# Patient Record
Sex: FEMALE | Race: WHITE | NOT HISPANIC OR LATINO
[De-identification: names, ages, dates, MRNs, and addresses within clinical notes are randomized per-mention and may not be internally consistent; named-entity substitution may affect disease eponyms.]

---

## 2018-02-22 PROBLEM — Z00.00 ENCOUNTER FOR PREVENTIVE HEALTH EXAMINATION: Status: ACTIVE | Noted: 2018-02-22

## 2018-03-01 ENCOUNTER — APPOINTMENT (OUTPATIENT)
Dept: ENDOCRINOLOGY | Facility: CLINIC | Age: 68
End: 2018-03-01
Payer: MEDICARE

## 2018-03-01 VITALS
HEART RATE: 64 BPM | WEIGHT: 95.56 LBS | DIASTOLIC BLOOD PRESSURE: 66 MMHG | HEIGHT: 62 IN | SYSTOLIC BLOOD PRESSURE: 130 MMHG | BODY MASS INDEX: 17.59 KG/M2

## 2018-03-01 DIAGNOSIS — M81.0 AGE-RELATED OSTEOPOROSIS W/OUT CURRENT PATHOLOGICAL FRACTURE: ICD-10-CM

## 2018-03-01 DIAGNOSIS — G45.9 TRANSIENT CEREBRAL ISCHEMIC ATTACK, UNSPECIFIED: ICD-10-CM

## 2018-03-01 DIAGNOSIS — Z82.62 FAMILY HISTORY OF OSTEOPOROSIS: ICD-10-CM

## 2018-03-01 DIAGNOSIS — G43.909 MIGRAINE, UNSPECIFIED, NOT INTRACTABLE, W/OUT STATUS MIGRAINOSUS: ICD-10-CM

## 2018-03-01 PROCEDURE — 99204 OFFICE O/P NEW MOD 45 MIN: CPT

## 2018-03-01 RX ORDER — SIMVASTATIN 80 MG/1
TABLET, FILM COATED ORAL
Refills: 0 | Status: ACTIVE | COMMUNITY

## 2018-03-01 RX ORDER — CALCIUM CITRATE/VITAMIN D3 315MG-6.25
TABLET ORAL
Refills: 0 | Status: ACTIVE | COMMUNITY

## 2018-03-01 RX ORDER — WARFARIN SODIUM 6 MG/1
TABLET ORAL
Refills: 0 | Status: ACTIVE | COMMUNITY

## 2018-03-01 RX ORDER — BUTALB/ACETAMINOPHEN/CAFFEINE 50-325-40
TABLET ORAL
Refills: 0 | Status: ACTIVE | COMMUNITY

## 2018-03-01 RX ORDER — MULTIVITAMIN
TABLET ORAL
Refills: 0 | Status: ACTIVE | COMMUNITY

## 2018-03-02 ENCOUNTER — TRANSCRIPTION ENCOUNTER (OUTPATIENT)
Age: 68
End: 2018-03-02

## 2018-04-19 ENCOUNTER — APPOINTMENT (OUTPATIENT)
Dept: INFUSION THERAPY | Facility: HOSPITAL | Age: 68
End: 2018-04-19

## 2018-05-03 ENCOUNTER — OUTPATIENT (OUTPATIENT)
Dept: OUTPATIENT SERVICES | Facility: HOSPITAL | Age: 68
LOS: 1 days | End: 2018-05-03
Payer: MEDICARE

## 2018-05-03 ENCOUNTER — APPOINTMENT (OUTPATIENT)
Dept: INFUSION THERAPY | Facility: HOSPITAL | Age: 68
End: 2018-05-03

## 2018-05-03 DIAGNOSIS — M81.0 AGE-RELATED OSTEOPOROSIS WITHOUT CURRENT PATHOLOGICAL FRACTURE: ICD-10-CM

## 2018-05-03 PROCEDURE — 96365 THER/PROPH/DIAG IV INF INIT: CPT

## 2018-05-03 RX ORDER — ZOLEDRONIC ACID 5 MG/100ML
5 INJECTION, SOLUTION INTRAVENOUS ONCE
Qty: 0 | Refills: 0 | Status: DISCONTINUED | OUTPATIENT
Start: 2018-05-03 | End: 2018-05-18

## 2018-08-20 PROBLEM — G45.9 TRANSIENT ISCHEMIC ATTACK: Status: ACTIVE | Noted: 2018-03-01

## 2019-08-06 ENCOUNTER — TRANSCRIPTION ENCOUNTER (OUTPATIENT)
Age: 69
End: 2019-08-06

## 2019-11-08 ENCOUNTER — APPOINTMENT (OUTPATIENT)
Dept: ORTHOPEDIC SURGERY | Facility: CLINIC | Age: 69
End: 2019-11-08
Payer: MEDICARE

## 2019-11-08 VITALS — HEART RATE: 57 BPM | SYSTOLIC BLOOD PRESSURE: 152 MMHG | DIASTOLIC BLOOD PRESSURE: 80 MMHG

## 2019-11-08 VITALS — BODY MASS INDEX: 17.11 KG/M2 | HEIGHT: 62 IN | WEIGHT: 93 LBS

## 2019-11-08 PROCEDURE — 99204 OFFICE O/P NEW MOD 45 MIN: CPT

## 2019-12-03 ENCOUNTER — APPOINTMENT (OUTPATIENT)
Dept: ORTHOPEDIC SURGERY | Facility: CLINIC | Age: 69
End: 2019-12-03
Payer: MEDICARE

## 2019-12-03 VITALS — BODY MASS INDEX: 17.11 KG/M2 | WEIGHT: 93 LBS | HEIGHT: 62 IN

## 2019-12-03 DIAGNOSIS — S92.009A UNSPECIFIED FRACTURE OF UNSPECIFIED CALCANEUS, INITIAL ENCOUNTER FOR CLOSED FRACTURE: ICD-10-CM

## 2019-12-03 PROCEDURE — 99214 OFFICE O/P EST MOD 30 MIN: CPT

## 2019-12-03 PROCEDURE — 73650 X-RAY EXAM OF HEEL: CPT | Mod: LT

## 2019-12-10 PROBLEM — S92.009A CALCANEAL FRACTURE: Status: ACTIVE | Noted: 2019-11-08

## 2019-12-10 NOTE — HISTORY OF PRESENT ILLNESS
[de-identified] : Josie is 69F who presents with left ankle and foot pain.   She has had pain since 10/26/19.  She denies injury or trauma to the ankle. She has had pain and swelling around the ankle and foot.   It is   6/10 in intensity, described as achy in nature.  Better with rest, worse with weightbearing.  She denies bruising of the area.  She had an MRI of the foot which showed a transverse nondisplaced calcaneal fracture along the anterior process with diffuse osteopenia\par

## 2019-12-10 NOTE — HISTORY OF PRESENT ILLNESS
[de-identified] : Josie presents for follow of left calcaneal stress fracture.  She has been wearing the boot.  Overall, pain is improved.  No new injury.  The swelilng has subsided.  Denies numbness, tingling.

## 2019-12-10 NOTE — DISCUSSION/SUMMARY
[de-identified] : Josie presents with calcaneal stress fracture.  She should continue non weightbearing in the boot for at least a total of 6 weeks.  Given her significant osteopenia, she may need up to 8 weeks in the boot.  Follow up in 3 weeks at which time we will re evaluate.  Eventually, we will refer her to PT to help wean from the boot.\par Liane Ricks MD, EdM\par Sports Medicine PM&R\par

## 2019-12-10 NOTE — PHYSICAL EXAM
[de-identified] : Constitutional: Well-nourished, well-developed, No acute distress\par Respiratory:  Good respiratory effort, no SOB\par Lymphatic: No regional lymphadenopathy, no lymphedema\par Psychiatric: Pleasant and normal affect, alert and oriented x3\par Skin: Clean dry and intact B/L UE/LE\par Musculoskeletal: normal except where as noted in regional exam\par left  Ankle:\par APPEARANCE: no marked deformities, no swelling or malalignment\par POSITIVE TENDERNESS: ATFL, subtalar joint, mild at mid anterior calcaneus\par NONTENDER: medial malleolus, lateral malleolus, tibialis posterior tendon, achilles tendon, no marked thickening of tendon, ATFL, CFL, PTFL, anterior tibiofibular ligament (high ankle), sinus tarsus, deltoid ligaments, 5th metatarsal. \par RANGE OF MOTION: full & painless. \par RESISTIVE TESTING: painless resisted dorsiflexion, plantar flexion, inversion & eversion. \par SPECIAL TESTS: neg calcaneal squeeze, calcaneal bump,  neg anterior drawer. neg talar tilt. neg Kleiger's\par NEURO: Normal sensation of LE, DTRs 2+/4 patella and achilles\par PULSES: 2+ DP/PT pulses\par B/L Hips: No asymmetry, malalignment, or swelling, Full ROM, 5/5 strength in flexion/ext, IR/ER, Abd/Add, Joints stable\par B/L Knees: No asymmetry, malalignment, or swelling, Full ROM, 5/5 strength in Flex/Ext, Joints stable\par  [de-identified] : \par The following radiographs were ordered and read by me during this patient's visit. I reviewed each radiograph in detail with the patient and discussed the findings as highlighted below. \par \par 2 views of the calcaneus were obtained today that show diffuse osteopenia.  There is no displaced fracture visualized\par

## 2019-12-10 NOTE — PHYSICAL EXAM
[de-identified] : Constitutional: Well-nourished, well-developed, No acute distress\par Respiratory:  Good respiratory effort, no SOB\par Lymphatic: No regional lymphadenopathy, no lymphedema\par Psychiatric: Pleasant and normal affect, alert and oriented x3\par Skin: Clean dry and intact B/L UE/LE\par Musculoskeletal: normal except where as noted in regional exam\par left  Ankle:\par APPEARANCE: no marked deformities, no swelling or malalignment\par POSITIVE TENDERNESS: ATFL, subtalar joint, mild at calcaneus\par NONTENDER: medial malleolus, lateral malleolus, tibialis posterior tendon, achilles tendon, no marked thickening of tendon, ATFL, CFL, PTFL, anterior tibiofibular ligament (high ankle), sinus tarsus, deltoid ligaments, 5th metatarsal. \par RANGE OF MOTION: full & painless. \par RESISTIVE TESTING: painless resisted dorsiflexion, plantar flexion, inversion & eversion. \par SPECIAL TESTS: neg calcaneal squeeze, calcaneal bump,  neg anterior drawer. neg talar tilt. neg Kleiger's\par NEURO: Normal sensation of LE, DTRs 2+/4 patella and achilles\par PULSES: 2+ DP/PT pulses\par B/L Hips: No asymmetry, malalignment, or swelling, Full ROM, 5/5 strength in flexion/ext, IR/ER, Abd/Add, Joints stable\par B/L Knees: No asymmetry, malalignment, or swelling, Full ROM, 5/5 strength in Flex/Ext, Joints stable\par  [de-identified] : Patient comes to today's visit with outside imaging already performed.  I reviewed the images in detail with the patient and discussed the findings as highlighted below. \par \par MRI left ankle, impression as follows:\par 1. diffuse marrow edema throughout all the osseous structures of the ankle.  This could related to osteopenia or diffuse stress reaction. \par 2. nondisplaced transverse fracture of the anterior process of the calcaneus\par 3 large ankle, small posterior subtalar and small talonavicular joint effusions.\par 4. degenerative changes of the navicular middle cuneiform joint\par 5. findings can be seen in the setting of sinus tarsi syndrome\par 6. severe subcutaneous edema surround the right ankle\par 7. no atfl tear or peroneal tendon tear

## 2020-01-07 ENCOUNTER — APPOINTMENT (OUTPATIENT)
Dept: ORTHOPEDIC SURGERY | Facility: CLINIC | Age: 70
End: 2020-01-07

## 2021-03-18 ENCOUNTER — APPOINTMENT (OUTPATIENT)
Dept: ENDOCRINOLOGY | Facility: CLINIC | Age: 71
End: 2021-03-18

## 2022-06-27 ENCOUNTER — EMERGENCY (EMERGENCY)
Facility: HOSPITAL | Age: 72
LOS: 1 days | Discharge: HOME | End: 2022-06-27
Attending: STUDENT IN AN ORGANIZED HEALTH CARE EDUCATION/TRAINING PROGRAM
Payer: MEDICARE

## 2022-06-27 VITALS
HEIGHT: 64 IN | DIASTOLIC BLOOD PRESSURE: 75 MMHG | RESPIRATION RATE: 16 BRPM | SYSTOLIC BLOOD PRESSURE: 150 MMHG | HEART RATE: 61 BPM | WEIGHT: 87.96 LBS | TEMPERATURE: 98 F | OXYGEN SATURATION: 98 %

## 2022-06-27 PROCEDURE — 70450 CT HEAD/BRAIN W/O DYE: CPT | Mod: 26,MA

## 2022-06-27 PROCEDURE — 99284 EMERGENCY DEPT VISIT MOD MDM: CPT

## 2022-06-27 PROCEDURE — 99284 EMERGENCY DEPT VISIT MOD MDM: CPT | Mod: 25

## 2022-06-27 PROCEDURE — 70450 CT HEAD/BRAIN W/O DYE: CPT | Mod: MA

## 2022-06-27 RX ORDER — TETANUS TOXOID, REDUCED DIPHTHERIA TOXOID AND ACELLULAR PERTUSSIS VACCINE, ADSORBED 5; 2.5; 8; 8; 2.5 [IU]/.5ML; [IU]/.5ML; UG/.5ML; UG/.5ML; UG/.5ML
0.5 SUSPENSION INTRAMUSCULAR ONCE
Refills: 0 | Status: DISCONTINUED | OUTPATIENT
Start: 2022-06-27 | End: 2022-06-27

## 2022-06-27 NOTE — ED ADULT NURSE NOTE - NS ED NURSE LEVEL OF CONSCIOUSNESS ORIENTATION
PT here today for enrollment in [facility]. PT was given informational packet and key points reviewed. Educated pt on reasoning from Coumadin therapy which includes pt's risk of other blood clots due to clotting disorder. Explained mechanism of action with Coumadin and Vitamin k. Risk and benefits of therapy were explained including rationale for INR range. Stressed compliance and consistency with diet, medications and appts. PT was educated on interactions of other medications and instructed to call [facility] with any med changes immediately including those that are short term. Instructed pt to take Coumadin between supper time and bed time. PT was educated on expectations of visits including intervals and frequencies. Educated pt on dietary restrictions and Coumadin friendly diet. PT was given informational sheets regarding acceptable green veggies.  Educated pt on reasoning behind not consuming those fruits and vegs. PT states she will avoid those high vitamin k foods. PT was instructed to contact [facility] for any planned medical procedures and to make sure all providers are aware of Coumadin therapy. PT was educated on s/s of bleeding and to report bleeding to ER immediately. PT was advised to wear medical alert bracelet. Emphasized safety while on blood thinner including power tool safety and to always obtain CT scan for head/blunt trauma. PT was instructed that [facility] would manage pt's INR and refill Coumadin.  Pt has been on Eliquis with last dose Saturday. Pt was instructed on dosing and to have a serving of green veggies today; pt verbalized. Will recheck in 2 days due to rapid rise in INR which could possibly be r/t false elevation from Eliquis. Approximately 30 minutes was spent with pt and she was receptive to learning. Findings reported by Hannah Gutierrez RN.    Today's INR is Lab Results - Last 18 Months   Lab Units 01/17/22  0000   INR  2.60*          
Oriented - self; Oriented - place; Oriented - time

## 2022-06-27 NOTE — ED PROVIDER NOTE - NS ED ROS FT
ROS: headache and abrasions   GENERAL: No fever, no chills  EYES: no change in vision  HEENT: no trouble swallowing, no trouble speaking  CARDIAC: no chest pain  PULMONARY: no cough, no shortness of breath  GI: no abdominal pain, no nausea, no vomiting, no diarrhea, no constipation  : No dysuria, no frequency, no change in appearance, or odor of urine  SKIN: no rashes  NEURO: no weakness  MSK: No joint pain    Osmel Gallegos PGY3

## 2022-06-27 NOTE — ED ADULT TRIAGE NOTE - CHIEF COMPLAINT QUOTE
Reports had headache after a heavy object fell on her while shopping yesterday ,denied falling/loc ,pain resolved now

## 2022-06-27 NOTE — ED PROVIDER NOTE - CLINICAL SUMMARY MEDICAL DECISION MAKING FREE TEXT BOX
72 year old female with prior medical history including CVA (no deficits, currently on Xarelto) presents to the ED with complaints of a headache after a  heavy pack of soda fell on top of her head. Patient is well appearing and neurologically intact. Low suspicion, but will screen for ICH with CT head. 72 year old female with prior medical history including CVA (no deficits, currently on Xarelto) presents to the ED with complaints of a headache after a  heavy pack of soda fell on top of her head. Patient is well appearing and neurologically intact. Low suspicion, but will screen for ICH with CT head. UTD on tetanus.

## 2022-06-27 NOTE — ED PROVIDER NOTE - OBJECTIVE STATEMENT
72 year old female with prior medical history including CVA (without deficits, currently on Xarelto) presents to the ED with complaints of a headache after a heavy pack of soda fell on top of her head yesterday. Patient denies loss of consciousness, falling at the time, and any other physical injuries and symptoms. Patient endorses that she sustained superficial abrasions to her scalp during the incident.  Allergy: ibuprofen (GI symptoms)

## 2022-06-27 NOTE — ED PROVIDER NOTE - PATIENT PORTAL LINK FT
You can access the FollowMyHealth Patient Portal offered by [facility] by registering at the following website: [email or web address]. By joining [employer]’s FollowMyHealth portal, you will also be able to view your health information using other applications (apps) compatible with our system.

## 2022-06-27 NOTE — ED PROVIDER NOTE - PHYSICAL EXAMINATION
Gen: AAOx3, non-toxic  Head: NCAT  HEENT: EOMI, oral mucosa moist, normal conjunctiva  Lung: CTAB, no respiratory distress, no wheezes/rhonchi/rales B/L, speaking in full sentences  CV: RRR, no murmurs, rubs or gallops  Abd: soft, NTND, no guarding, no CVA tenderness  MSK: no visible deformities, no midline spinal tenderness   Neuro: No focal sensory or motor deficits, normal CN exam   Skin: superficial abrasion to midline frontal scalp, no laceration or hematoma  Psych: normal affect.     Osmel Gallegos PGY3

## 2022-06-27 NOTE — ED ADULT NURSE NOTE - NSFALLRSKOUTCOME_ED_ALL_ED
PHYSICAL EXAM:    CONSTITUTIONAL: NAD, disheveled.  HEAD:  Atraumatic, normo-cephalic  EYES: EOMI, PERRLA, clear conjunctivae, anicteric sclerae, no nystagmus  NECK: Supple, no JVD, no carotid bruit   CHEST/LUNG: Clear to auscultation bilaterally, no wheezing or rhonchi  HEART: S1 S2 normal, regular rate and rhythm. No murmurs, rubs, or gallops  ABDOMEN: Bowel sounds present. Soft, non-tender, non-distended. No rebound or guarding. Left reducible inguinal hernia.   EXTREMITIES:  1+ peripheral pulses, no clubbing, cyanosis, or edema, left varicose veins.   PSYCH: Normal affect. Cooperative.  NEUROLOGY: AAOx2. No focal deficits. CN II-XII grossly intact  MSK: No joint swelling, redness   SKIN: Normal turgor, no rashes or lesions, onychomycosis of the bilateral toes Universal Safety Interventions

## 2023-12-09 ENCOUNTER — INPATIENT (INPATIENT)
Facility: HOSPITAL | Age: 73
LOS: 2 days | Discharge: HOME | DRG: 65 | End: 2023-12-12
Attending: INTERNAL MEDICINE | Admitting: INTERNAL MEDICINE
Payer: MEDICARE

## 2023-12-09 VITALS
DIASTOLIC BLOOD PRESSURE: 74 MMHG | SYSTOLIC BLOOD PRESSURE: 165 MMHG | TEMPERATURE: 98 F | RESPIRATION RATE: 18 BRPM | HEART RATE: 63 BPM | WEIGHT: 97 LBS | HEIGHT: 61 IN | OXYGEN SATURATION: 98 %

## 2023-12-09 DIAGNOSIS — G45.9 TRANSIENT CEREBRAL ISCHEMIC ATTACK, UNSPECIFIED: ICD-10-CM

## 2023-12-09 DIAGNOSIS — Z29.9 ENCOUNTER FOR PROPHYLACTIC MEASURES, UNSPECIFIED: ICD-10-CM

## 2023-12-09 DIAGNOSIS — G43.909 MIGRAINE, UNSPECIFIED, NOT INTRACTABLE, WITHOUT STATUS MIGRAINOSUS: ICD-10-CM

## 2023-12-09 DIAGNOSIS — E78.5 HYPERLIPIDEMIA, UNSPECIFIED: ICD-10-CM

## 2023-12-09 PROBLEM — I63.9 CEREBRAL INFARCTION, UNSPECIFIED: Chronic | Status: ACTIVE | Noted: 2022-06-27

## 2023-12-09 LAB
ALBUMIN SERPL ELPH-MCNC: 3.7 G/DL — SIGNIFICANT CHANGE UP (ref 3.5–5)
ALBUMIN SERPL ELPH-MCNC: 3.7 G/DL — SIGNIFICANT CHANGE UP (ref 3.5–5)
ALP SERPL-CCNC: 68 U/L — SIGNIFICANT CHANGE UP (ref 40–120)
ALP SERPL-CCNC: 68 U/L — SIGNIFICANT CHANGE UP (ref 40–120)
ALT FLD-CCNC: 44 U/L DA — SIGNIFICANT CHANGE UP (ref 10–60)
ALT FLD-CCNC: 44 U/L DA — SIGNIFICANT CHANGE UP (ref 10–60)
ANION GAP SERPL CALC-SCNC: 3 MMOL/L — LOW (ref 5–17)
ANION GAP SERPL CALC-SCNC: 3 MMOL/L — LOW (ref 5–17)
APTT BLD: 28.1 SEC — SIGNIFICANT CHANGE UP (ref 24.5–35.6)
APTT BLD: 28.1 SEC — SIGNIFICANT CHANGE UP (ref 24.5–35.6)
AST SERPL-CCNC: 31 U/L — SIGNIFICANT CHANGE UP (ref 10–40)
AST SERPL-CCNC: 31 U/L — SIGNIFICANT CHANGE UP (ref 10–40)
BASOPHILS # BLD AUTO: 0.04 K/UL — SIGNIFICANT CHANGE UP (ref 0–0.2)
BASOPHILS # BLD AUTO: 0.04 K/UL — SIGNIFICANT CHANGE UP (ref 0–0.2)
BASOPHILS NFR BLD AUTO: 1.2 % — SIGNIFICANT CHANGE UP (ref 0–2)
BASOPHILS NFR BLD AUTO: 1.2 % — SIGNIFICANT CHANGE UP (ref 0–2)
BILIRUB SERPL-MCNC: 0.3 MG/DL — SIGNIFICANT CHANGE UP (ref 0.2–1.2)
BILIRUB SERPL-MCNC: 0.3 MG/DL — SIGNIFICANT CHANGE UP (ref 0.2–1.2)
BUN SERPL-MCNC: 10 MG/DL — SIGNIFICANT CHANGE UP (ref 7–18)
BUN SERPL-MCNC: 10 MG/DL — SIGNIFICANT CHANGE UP (ref 7–18)
CALCIUM SERPL-MCNC: 8 MG/DL — LOW (ref 8.4–10.5)
CALCIUM SERPL-MCNC: 8 MG/DL — LOW (ref 8.4–10.5)
CHLORIDE SERPL-SCNC: 103 MMOL/L — SIGNIFICANT CHANGE UP (ref 96–108)
CHLORIDE SERPL-SCNC: 103 MMOL/L — SIGNIFICANT CHANGE UP (ref 96–108)
CO2 SERPL-SCNC: 29 MMOL/L — SIGNIFICANT CHANGE UP (ref 22–31)
CO2 SERPL-SCNC: 29 MMOL/L — SIGNIFICANT CHANGE UP (ref 22–31)
CREAT SERPL-MCNC: 0.55 MG/DL — SIGNIFICANT CHANGE UP (ref 0.5–1.3)
CREAT SERPL-MCNC: 0.55 MG/DL — SIGNIFICANT CHANGE UP (ref 0.5–1.3)
EGFR: 96 ML/MIN/1.73M2 — SIGNIFICANT CHANGE UP
EGFR: 96 ML/MIN/1.73M2 — SIGNIFICANT CHANGE UP
EOSINOPHIL # BLD AUTO: 0.06 K/UL — SIGNIFICANT CHANGE UP (ref 0–0.5)
EOSINOPHIL # BLD AUTO: 0.06 K/UL — SIGNIFICANT CHANGE UP (ref 0–0.5)
EOSINOPHIL NFR BLD AUTO: 1.8 % — SIGNIFICANT CHANGE UP (ref 0–6)
EOSINOPHIL NFR BLD AUTO: 1.8 % — SIGNIFICANT CHANGE UP (ref 0–6)
ETHANOL SERPL-MCNC: <3 MG/DL — SIGNIFICANT CHANGE UP (ref 0–10)
ETHANOL SERPL-MCNC: <3 MG/DL — SIGNIFICANT CHANGE UP (ref 0–10)
GLUCOSE SERPL-MCNC: 94 MG/DL — SIGNIFICANT CHANGE UP (ref 70–99)
GLUCOSE SERPL-MCNC: 94 MG/DL — SIGNIFICANT CHANGE UP (ref 70–99)
HCT VFR BLD CALC: 36 % — SIGNIFICANT CHANGE UP (ref 34.5–45)
HCT VFR BLD CALC: 36 % — SIGNIFICANT CHANGE UP (ref 34.5–45)
HGB BLD-MCNC: 11.2 G/DL — LOW (ref 11.5–15.5)
HGB BLD-MCNC: 11.2 G/DL — LOW (ref 11.5–15.5)
IMM GRANULOCYTES NFR BLD AUTO: 0 % — SIGNIFICANT CHANGE UP (ref 0–0.9)
IMM GRANULOCYTES NFR BLD AUTO: 0 % — SIGNIFICANT CHANGE UP (ref 0–0.9)
INR BLD: 0.9 RATIO — SIGNIFICANT CHANGE UP (ref 0.85–1.18)
INR BLD: 0.9 RATIO — SIGNIFICANT CHANGE UP (ref 0.85–1.18)
LYMPHOCYTES # BLD AUTO: 1.32 K/UL — SIGNIFICANT CHANGE UP (ref 1–3.3)
LYMPHOCYTES # BLD AUTO: 1.32 K/UL — SIGNIFICANT CHANGE UP (ref 1–3.3)
LYMPHOCYTES # BLD AUTO: 39.9 % — SIGNIFICANT CHANGE UP (ref 13–44)
LYMPHOCYTES # BLD AUTO: 39.9 % — SIGNIFICANT CHANGE UP (ref 13–44)
MCHC RBC-ENTMCNC: 30.3 PG — SIGNIFICANT CHANGE UP (ref 27–34)
MCHC RBC-ENTMCNC: 30.3 PG — SIGNIFICANT CHANGE UP (ref 27–34)
MCHC RBC-ENTMCNC: 31.1 GM/DL — LOW (ref 32–36)
MCHC RBC-ENTMCNC: 31.1 GM/DL — LOW (ref 32–36)
MCV RBC AUTO: 97.3 FL — SIGNIFICANT CHANGE UP (ref 80–100)
MCV RBC AUTO: 97.3 FL — SIGNIFICANT CHANGE UP (ref 80–100)
MONOCYTES # BLD AUTO: 0.3 K/UL — SIGNIFICANT CHANGE UP (ref 0–0.9)
MONOCYTES # BLD AUTO: 0.3 K/UL — SIGNIFICANT CHANGE UP (ref 0–0.9)
MONOCYTES NFR BLD AUTO: 9.1 % — SIGNIFICANT CHANGE UP (ref 2–14)
MONOCYTES NFR BLD AUTO: 9.1 % — SIGNIFICANT CHANGE UP (ref 2–14)
NEUTROPHILS # BLD AUTO: 1.59 K/UL — LOW (ref 1.8–7.4)
NEUTROPHILS # BLD AUTO: 1.59 K/UL — LOW (ref 1.8–7.4)
NEUTROPHILS NFR BLD AUTO: 48 % — SIGNIFICANT CHANGE UP (ref 43–77)
NEUTROPHILS NFR BLD AUTO: 48 % — SIGNIFICANT CHANGE UP (ref 43–77)
NRBC # BLD: 0 /100 WBCS — SIGNIFICANT CHANGE UP (ref 0–0)
NRBC # BLD: 0 /100 WBCS — SIGNIFICANT CHANGE UP (ref 0–0)
PLATELET # BLD AUTO: 277 K/UL — SIGNIFICANT CHANGE UP (ref 150–400)
PLATELET # BLD AUTO: 277 K/UL — SIGNIFICANT CHANGE UP (ref 150–400)
POTASSIUM SERPL-MCNC: 3.6 MMOL/L — SIGNIFICANT CHANGE UP (ref 3.5–5.3)
POTASSIUM SERPL-MCNC: 3.6 MMOL/L — SIGNIFICANT CHANGE UP (ref 3.5–5.3)
POTASSIUM SERPL-SCNC: 3.6 MMOL/L — SIGNIFICANT CHANGE UP (ref 3.5–5.3)
POTASSIUM SERPL-SCNC: 3.6 MMOL/L — SIGNIFICANT CHANGE UP (ref 3.5–5.3)
PROT SERPL-MCNC: 7 G/DL — SIGNIFICANT CHANGE UP (ref 6–8.3)
PROT SERPL-MCNC: 7 G/DL — SIGNIFICANT CHANGE UP (ref 6–8.3)
PROTHROM AB SERPL-ACNC: 10.3 SEC — SIGNIFICANT CHANGE UP (ref 9.5–13)
PROTHROM AB SERPL-ACNC: 10.3 SEC — SIGNIFICANT CHANGE UP (ref 9.5–13)
RBC # BLD: 3.7 M/UL — LOW (ref 3.8–5.2)
RBC # BLD: 3.7 M/UL — LOW (ref 3.8–5.2)
RBC # FLD: 13.6 % — SIGNIFICANT CHANGE UP (ref 10.3–14.5)
RBC # FLD: 13.6 % — SIGNIFICANT CHANGE UP (ref 10.3–14.5)
SODIUM SERPL-SCNC: 135 MMOL/L — SIGNIFICANT CHANGE UP (ref 135–145)
SODIUM SERPL-SCNC: 135 MMOL/L — SIGNIFICANT CHANGE UP (ref 135–145)
TROPONIN I, HIGH SENSITIVITY RESULT: 7.3 NG/L — SIGNIFICANT CHANGE UP
TROPONIN I, HIGH SENSITIVITY RESULT: 7.3 NG/L — SIGNIFICANT CHANGE UP
WBC # BLD: 3.31 K/UL — LOW (ref 3.8–10.5)
WBC # BLD: 3.31 K/UL — LOW (ref 3.8–10.5)
WBC # FLD AUTO: 3.31 K/UL — LOW (ref 3.8–10.5)
WBC # FLD AUTO: 3.31 K/UL — LOW (ref 3.8–10.5)

## 2023-12-09 PROCEDURE — 70450 CT HEAD/BRAIN W/O DYE: CPT | Mod: 26,MA,XU

## 2023-12-09 PROCEDURE — 70498 CT ANGIOGRAPHY NECK: CPT | Mod: 26,MA

## 2023-12-09 PROCEDURE — 0042T: CPT | Mod: MA

## 2023-12-09 PROCEDURE — 99222 1ST HOSP IP/OBS MODERATE 55: CPT

## 2023-12-09 PROCEDURE — 70496 CT ANGIOGRAPHY HEAD: CPT | Mod: 26,MA

## 2023-12-09 PROCEDURE — 99285 EMERGENCY DEPT VISIT HI MDM: CPT

## 2023-12-09 RX ORDER — ASPIRIN/CALCIUM CARB/MAGNESIUM 324 MG
81 TABLET ORAL DAILY
Refills: 0 | Status: DISCONTINUED | OUTPATIENT
Start: 2023-12-09 | End: 2023-12-12

## 2023-12-09 RX ORDER — ENOXAPARIN SODIUM 100 MG/ML
40 INJECTION SUBCUTANEOUS EVERY 24 HOURS
Refills: 0 | Status: DISCONTINUED | OUTPATIENT
Start: 2023-12-09 | End: 2023-12-11

## 2023-12-09 RX ORDER — CLOPIDOGREL BISULFATE 75 MG/1
75 TABLET, FILM COATED ORAL DAILY
Refills: 0 | Status: DISCONTINUED | OUTPATIENT
Start: 2023-12-09 | End: 2023-12-09

## 2023-12-09 RX ORDER — BUTALBITAL/ASPIRIN/CAFFEINE 50-325-40
2 TABLET ORAL
Refills: 0 | DISCHARGE

## 2023-12-09 RX ORDER — ASPIRIN/CALCIUM CARB/MAGNESIUM 324 MG
1 TABLET ORAL
Refills: 0 | DISCHARGE

## 2023-12-09 RX ORDER — ONDANSETRON 8 MG/1
1 TABLET, FILM COATED ORAL
Refills: 0 | DISCHARGE

## 2023-12-09 RX ORDER — ATORVASTATIN CALCIUM 80 MG/1
80 TABLET, FILM COATED ORAL AT BEDTIME
Refills: 0 | Status: DISCONTINUED | OUTPATIENT
Start: 2023-12-09 | End: 2023-12-11

## 2023-12-09 RX ORDER — ASPIRIN/CALCIUM CARB/MAGNESIUM 324 MG
324 TABLET ORAL ONCE
Refills: 0 | Status: COMPLETED | OUTPATIENT
Start: 2023-12-09 | End: 2023-12-09

## 2023-12-09 RX ORDER — RIVAROXABAN 15 MG-20MG
0 KIT ORAL
Qty: 0 | Refills: 0 | DISCHARGE

## 2023-12-09 RX ORDER — CLOPIDOGREL BISULFATE 75 MG/1
300 TABLET, FILM COATED ORAL ONCE
Refills: 0 | Status: COMPLETED | OUTPATIENT
Start: 2023-12-09 | End: 2023-12-09

## 2023-12-09 RX ORDER — SIMVASTATIN 20 MG/1
0.5 TABLET, FILM COATED ORAL
Refills: 0 | DISCHARGE

## 2023-12-09 RX ADMIN — ATORVASTATIN CALCIUM 80 MILLIGRAM(S): 80 TABLET, FILM COATED ORAL at 21:41

## 2023-12-09 RX ADMIN — CLOPIDOGREL BISULFATE 300 MILLIGRAM(S): 75 TABLET, FILM COATED ORAL at 12:38

## 2023-12-09 RX ADMIN — Medication 324 MILLIGRAM(S): at 12:38

## 2023-12-09 NOTE — ED PROVIDER NOTE - BIRTH SEX
[facility], [address] [place] [address] Urology Office Progress Note    Patient:  Zamzam Monet  YOB: 1957  Date: 9/7/2023    HISTORY OF PRESENT ILLNESS:   The patient is a 74 y.o. male  BPH symptoms are mild and not bothersome and thus medical therapy not indicated. No flank pain or gross hematuria to suggest recurrent kidney stones. Patient's 8/28/23 KUB shows stable non-obstructing bilateral kidney stones; no Rx required. Lower urinary tract symptoms: frequency, hesitancy, decreased urinary stream, nocturia, 2 times per night, and incomplete emptying.    Last AUA Symptom Score (QOL): 5 (2)  Today's AUA Symptom Score (QOL): 8 (2)    Summary of old records:   Kidney stones: h/o left 5 mm proximal ureteral calculus on 2/26/16 KUB, Left ESWL 3/17/16; 6 mm R mid pole KS on 10/31/16 KUB; 1/2/18, 8/27/20 KUB: Right 5 mm KS, Left 4 mm KS; 9/1/21, 8/30/22, 8/28/23 KUB (L=4, R=6 mm)  5/25/16 24 hour urine: vol=750 mL, normal Ca, Na, uric acid  Left renal cyst, 2.2 cm on 1/11/18 renal U/S, 1.3 cm on 10/10/18 U/S  Microhematuria with smoking hx: 2/6/20 CT-b/l KS (5mm L, 7 mm R), b/l renal cysts, BPH; 2/10/20 cysto: BPH  BPH    Additional History: none    Procedures Today: N/A    Urinalysis today:  Results for POC orders placed in visit on 09/07/23   POCT Urinalysis No Micro (Auto)   Result Value Ref Range    Color, UA yellow     Clarity, UA clear     Glucose, UA POC >=1000 mg/dl     Bilirubin, UA      Ketones, UA      Spec Grav, UA      Blood, UA POC neg     pH, UA      Protein, UA POC neg     Urobilinogen, UA      Leukocytes, UA neg     Nitrite, UA neg        Last several PSA's:  Lab Results   Component Value Date    PSA 2.62 08/28/2023    PSA 2.34 10/07/2022    PSA 2.93 09/01/2021       Last total testosterone:  No results found for: TESTOSTERONE    Last BUN and creatinine:  Lab Results   Component Value Date    BUN 21 12/01/2022     Lab Results   Component Value Date    CREATININE 1.37 Female

## 2023-12-09 NOTE — ED PROVIDER NOTE - CPE EDP GASTRO NORM
Arrives to room 315 S/P Bipolar Turp per Dr Mcgovern VSS afebrile denies pain no acute distress noted 20 [place] 3 WAy Rachel in place for continuous bladder irrigation 1100 ml clear pink urine emptied upon arrival to room Tele monitor in place box 8626 Lr infusing @100ml hour    normal...

## 2023-12-09 NOTE — H&P ADULT - NSHPPHYSICALEXAM_GEN_ALL_CORE
GENERAL: NAD, regular build  HEAD:  Atraumatic, Normocephalic  EYES: EOMI, PERRLA, conjunctiva and sclera clear  NECK: Supple  CHEST/LUNG: Clear to auscultation bilaterally, no RRW  HEART: Regular rate and rhythm; No murmurs, rubs, or gallops  ABDOMEN: Soft, Nontender, Nondistended; Bowel sounds present  EXTREMITIES:  2+ Peripheral Pulses, No edema  PSYCH: AAOx3  NEUROLOGY: non-focal. Negative F2F test and no pronator drift. 5/5 strength in all extremities, no slurring of speech.  SKIN: No rashes or lesions

## 2023-12-09 NOTE — ED ADULT NURSE NOTE - NSFALLUNIVINTERV_ED_ALL_ED
Bed/Stretcher in lowest position, wheels locked, appropriate side rails in place/Call bell, personal items and telephone in reach/Instruct patient to call for assistance before getting out of bed/chair/stretcher/Non-slip footwear applied when patient is off stretcher/[place] to call system/Physically safe environment - no spills, clutter or unnecessary equipment/Purposeful proactive rounding/Room/bathroom lighting operational, light cord in reach Bed/Stretcher in lowest position, wheels locked, appropriate side rails in place/Call bell, personal items and telephone in reach/Instruct patient to call for assistance before getting out of bed/chair/stretcher/Non-slip footwear applied when patient is off stretcher/[place] to call system/Physically safe environment - no spills, clutter or unnecessary equipment/Purposeful proactive rounding/Room/bathroom lighting operational, light cord in reach

## 2023-12-09 NOTE — ED PROVIDER NOTE - PROGRESS NOTE DETAILS
Radiologist called with CT perfusion showing ischemia in right pareital-occipital area that appears to be subacute.  He is reviewing the angio images now.  Discussed with Pt and her  again and she has not had any recent stroke symptoms that would go along with "subacute" time frame. I reassessed Pt and her symptoms have improved.  NIH Stroke Scale zero now. D/w Dr. Gilbert from telestroke and he advises  mg and Plavix 300 mg for now, but he agrees that TNK and thrombectomy are not indicated.

## 2023-12-09 NOTE — ED PROVIDER NOTE - NEUROLOGICAL, MLM
Alert and oriented, no focal deficits, no motor or sensory deficits.  No facial droop, no dysarthria.  Normal coordination.

## 2023-12-09 NOTE — ED ADULT NURSE NOTE - NS ED NURSE LEVEL OF CONSCIOUSNESS AFFECT
Appropriate Solaraze Counseling:  I discussed with the patient the risks of Solaraze including but not limited to erythema, scaling, itching, weeping, crusting, and pain.

## 2023-12-09 NOTE — ED PROVIDER NOTE - OBJECTIVE STATEMENT
73-year-old woman, history of prior TIA and stroke, hyperlipidemia, presents accompanied by her  for difficulty speaking, disorientation, and questionable right facial droop (as noted by her ).  He says that the facial droop seems to have resolved but she still seems disoriented in his opinion.  Last known normal was about 7 AM this morning when patient left the home to hang out with her friends, but then around 9 AM, the  received a phone call from the friends that the patient stopped talking.  There is no other report of any weakness or numbness.  She denies any other symptoms.  She is not on any anticoagulation.  No report of seizure.

## 2023-12-09 NOTE — H&P ADULT - PROBLEM SELECTOR PLAN 1
Pt presents with difficulty forming words, resolved upon arrival to ED  CT Head: Evidence of moderate chronic microvascular ischemic change with newly visualized focus of moderate hypoattenuation-cytotoxic edema right parieto-occipital region; a finding suspicious for acute-subacute ischemia in the appropriate clinical setting.  NIHSS 1 on admission (aphasia)  - C/w Neuro checks q4 hours  - C/w TELE monitoring  - C/w ASA, Statin, Plavix  - F/u Echo  - F/u MRI  - F/u A1c, Lipid Panel  Neuro  ___ Consulted Pt presents with difficulty forming words, resolved upon arrival to ED  CT Head: Evidence of moderate chronic microvascular ischemic change with newly visualized focus of moderate hypoattenuation-cytotoxic edema right parieto-occipital region; a finding suspicious for acute-subacute ischemia in the appropriate clinical setting.  NIHSS 1 on admission (aphasia)  - C/w Neuro checks q4 hours  - C/w TELE monitoring  - C/w ASA, Statin, Plavix  - F/u Echo  - F/u MRI  - F/u A1c, Lipid Panel  Neuro Dr. Jimenez Consulted

## 2023-12-09 NOTE — H&P ADULT - PROBLEM SELECTOR PLAN 2
Pt has a history of HLD, takes Simvastatin 10mg daily at home  - Increased to high intensity statin  - C/w Atorvastatin 80mg daily

## 2023-12-09 NOTE — ED PROVIDER NOTE - CLINICAL SUMMARY MEDICAL DECISION MAKING FREE TEXT BOX
73-year-old woman, history of prior TIA and stroke, hyperlipidemia, presents accompanied by her  for difficulty speaking, disorientation, and questionable right facial droop (as noted by her ).  He says that the facial droop seems to have resolved but she still seems disoriented in his opinion.  Last known normal was about 7 AM this morning when patient left the home to hang out with her friends, but then around 9 AM, the  received a phone call from the friends that the patient stopped talking--code stroke called--CT head, labs, EKG, FS BG, reassess.

## 2023-12-09 NOTE — H&P ADULT - HISTORY OF PRESENT ILLNESS
This is a 72 y/o F, from home with PMHx of three previous TIAs (no residual deficits), HLD, and migraines who presents with difficulty speaking. Pt states this morning she had difficulty saying any words which prompted her  to bring her to the hospital. Speech improved once she arrived to the hospital. Pt denies any recent illnesses, CP, fever, SOB, N/V/D, headaches, numbness or tingling.

## 2023-12-09 NOTE — CONSULT NOTE ADULT - ASSESSMENT
Acute left hemispheric infarct and older right parietooccipital infarcts; multiple microhemorrhages - ?consistent with cerebral amyloid angiopathy. continue aspirin; hold clopidogrel; continue atorvastatin; MR head w/o contrast

## 2023-12-09 NOTE — ED ADULT TRIAGE NOTE - CHIEF COMPLAINT QUOTE
reports pt " stopped talking mouth drooped " an hour ago pt was with friends and witnessed it  no facial droops noted at this time   gait imbalanced , not herself per

## 2023-12-09 NOTE — CONSULT NOTE ADULT - SUBJECTIVE AND OBJECTIVE BOX
Patient is a 73y old  Female who presents with a chief complaint of TIA (09 Dec 2023 14:40)      HPI:  Transient difficulty speaking and weakness right side now better; In the car in which she was brought tto the hospital, she was unable to find objects in her bag; here too she was unable to find valuables in her bag to give to her  for safekeeping.  She was able to access records from [facility]; I have reviewed the MR head that was done in May when she presented with transient visual difficulty in one eye - the MRI showed right parietal punctate DWI image signals but also showed bilateral punctate and larger SWI low intensity of old hemosiderin   PAST MEDICAL & SURGICAL HISTORY:  Cerebrovascular accident (CVA)      H/O hyperlipidemia      No significant past surgical history          FAMILY HISTORY:        Social Hx:  Nonsmoker, no drug or alcohol use    MEDICATIONS  (STANDING):  aspirin  chewable 81 milliGRAM(s) Oral daily  atorvastatin 80 milliGRAM(s) Oral at bedtime  enoxaparin Injectable 40 milliGRAM(s) SubCutaneous every 24 hours       Allergies    ibuprofen (Other)    Intolerances        ROS: Pertinent positives in HPI, all other ROS were reviewed and are negative.      Vital Signs Last 24 Hrs  T(C): 36.4 (09 Dec 2023 16:40), Max: 36.6 (09 Dec 2023 10:06)  T(F): 97.5 (09 Dec 2023 16:40), Max: 97.9 (09 Dec 2023 10:06)  HR: 62 (09 Dec 2023 16:40) (59 - 66)  BP: 136/72 (09 Dec 2023 16:40) (133/70 - 174/93)  BP(mean): --  RR: 18 (09 Dec 2023 16:40) (18 - 20)  SpO2: 99% (09 Dec 2023 16:40) (95% - 99%)    Parameters below as of 09 Dec 2023 16:40  Patient On (Oxygen Delivery Method): room air            Constitutional: awake and alert.  HEENT: PERRLA, EOMI,   Neck: Supple.  Respiratory: Breath sounds are clear bilaterally  Cardiovascular: S1 and S2, regular rhythm  Gastrointestinal: soft, nontender  Extremities:  no edema  Vascular: Caritid Bruit - no  Musculoskeletal: no joint swelling/tenderness, no abnormal movements  Skin: No rashes    Neurological exam:  HF: A x O x 3. Appropriately interactive, normal affect. Speech fluent, No Aphasia or paraphasic errors. Mild disorder Naming /repetition intact   CN: BO, EOMI, VFF, facial sensation normal, no NLFD, tongue midline, Palate moves equally, SCM equal bilaterally  Motor: No pronator drift, Strength 5/5 in all 4 ext, normal bulk and tone, no tremor, rigidity or bradykinesia.    Sens: Intact to light touch / PP/ VS/ JS    Reflexes: Symmetric and normal . BJ 2+, BR 2+, KJ 2+, AJ 2+, downgoing toes b/l  Coord:  No FNFA, dysmetria, VIRGILIO intact   Gait/Balance: Normal/    NIHSS: 2  MRS2    1A: Level of consciousness       0= Alert; keenly responsive    1B: Ask month and age       0= Both questions right    1C: "Blink eyes" and "Squeeze Hands"       0= Performs both      2: Horizontal EOMs       0= Normal      3: Visual fields       0= No visual loss      4: Facial palsy (use grimace if obtunded)       0= Normal symmetry     )    5A: Left arm motor drift (count out loud and use fingers to show count)       0= No drift x 10 seconds       5B: Right arm motor drift       0= No drift x 10 seconds         6A: Left leg motor drift       0= No drift x 10 seconds           6B: Right leg motor drift       0= No drift x 10 seconds    7: Limb ataxia (FNF/heel-shin)       +1= Ataxia in 1 limb       8: Sensation       0= Normal, no sensory loss     9: Language/aphasia- describe the scene (on joy); name the items; read the sentences (on joy)       +1= mild-moderate aphasia some obvious changes without significant limitation           10: Dysarthria- read the words       0= Normal    11: Extinction/inattention       0= No abnormality         Labs:                        11.2   3.31  )-----------( 277      ( 09 Dec 2023 10:45 )             36.0     12-09    135  |  103  |  10  ----------------------------<  94  3.6   |  29  |  0.55    Ca    8.0<L>      09 Dec 2023 10:45    TPro  7.0  /  Alb  3.7  /  TBili  0.3  /  DBili  x   /  AST  31  /  ALT  44  /  AlkPhos  68  12-09        PT/INR - ( 09 Dec 2023 10:45 )   PT: 10.3 sec;   INR: 0.90 ratio         PTT - ( 09 Dec 2023 10:45 )  PTT:28.1 sec    Radiology report:  - CT head:    < from: CT Brain Perfusion Maps Stroke (12.09.23 @ 10:48) >    ACC: [id number] EXAM:  CT ANGIO BRAIN STROKE PROTC IC   ORDERED BY: KENDAL JACKSON     ACC: [id number] EXAM:  CT ANGIO NECK STROKE PROTCL IC   ORDERED BY: KENDAL JACKSON     ACC: [id number] EXAM:  CT BRAIN STROKE PROTOCOL   ORDERED BY: KENDAL JACKSON     ACC: [id number] EXAM:  CT BRAIN PERFUSION MAPS STROKE   ORDERED BY: KENDAL JACKSON     PROCEDURE DATE:  12/09/2023          INTERPRETATION:  CLINICAL STATEMENT: Stroke code. Clinical concern for   CVA.    TECHNIQUE: Noncontrast CT brain axial plane. CTA brain and neck. CT   perfusion: After the administration of 50 cc of Omnipaque 300 serial thin   sections were obtained through the brain the purposes of evaluating CT   perfusion. Raw data was sent to the ischemia rapid view software for   postprocessing. 90cc Omnipaque 350 Intravenous contrast was administered   for the CTA compartment. 2-D MIP and 3-D volume rendering images. Degree   of extracranial carotid stenosis calculated utilizing NASCET criteria.   Artifact related to regions of dense venous contrast results in   suboptimal evaluation, obscuring the adjacent structures.  COMPARISON: CT brain 6/27/2022.    FINDINGS:    CT BRAIN    Mucosal thickening visualized paranasal sinuses. No air-fluid levels or   opacification. Incompletely pneumatized with partial opacification right   mastoid air cells. Left mastoid air cells and bilateral middle ear   regions well-aerated. No aggressive calvarial lesion.    Again seen is asymmetric size of the left greater than right lateral   ventricles, likely anatomic for the patient. Cavum septum pellucidum with   rightward deviation. Moderate patchy white matter hypodensities again   noted; a nonspecific finding which statistically reflects chronic   microvascular ischemic change. Newly visualized focus of moderately   hypoattenuation with cytotoxic edema right parieto-occipital region, with   sulcal effacement-mass effect.    No evidence of extra-axial collection or acute hemorrhage.    CTA NECK    Aortic arch and great vessel origins grossly unremarkable, however the   left subclavian origin is partially obscured by artifact related to   adjacent dense venous contrast.    Left proximal-mid vertebral artery obscured by adjacent dense venous   contrast, precluding evaluation. Otherwise, the vertebral arteries appear   patent and codominant. Segmental luminal irregularity-nodular artery   right proximal V3 segment.    Left extracranial carotid arteries largely obscured by artifact related   to adjacent dense venous contrast. Otherwise as visualized, there is no   definitive evidence of significant stenosis, occlusion or dissection   bilateral extracranial carotid arteries.    CTA BRAIN    Evidence of severe segmental stenosis P3 segment RIGHT posterior cerebral   artery (series 7 image [id number]). Moderate segmental stenosis P2 segment   LEFT posterior cerebral artery. No evidence of significant stenosis   distal vertebral and basilar arteries. Prepped    Abrupt truncation of a probable M4 cortical branch LEFT middle cerebral   artery(series 7 image 107-110) ipsilateral posterior frontal region.   Mild calcified plaque bilateral carotid siphons, without significant   stenosis.    No evidence of large vessel occlusion, definitive aneurysm or vascular   malformation.    CT PERFUSION    No core infarct.    CBF<30% volume: 0 ml  Tmax>6.0 s volume: 4 ml  Mismatch volume: 4 ml  Mismatch ratio: Infinite.    OTHER    No evidence of dural venous or jugular vein thrombosis. High riding right   jugular bulb with evidence of dehiscence of the ipsilateral sigmoid   plate. Bilateral lens replacement surgery. No suspicious upper lung mass.   Multifocal degenerative change. No aggressive osseous lesion.    IMPRESSION:    CT BRAIN  1. No evidence of acute hemorrhage.  2. Evidence of moderate chronic microvascular ischemic change with newly   visualized focus of moderate hypoattenuation-cytotoxic edema right   parieto-occipital region; a finding suspicious for acute-subacute   ischemia in the appropriate clinical setting.  3. Additional findings described in detail above.    CT PERFUSION  1. No predicted core infarct.  2. Evidence of active ischemia-tissue at risk right parieto-occipital   region (4 mL).    CTA  1. Partial obscuration of the left-sided neck vasculature secondary to  artifact related to adjacent dense venous contrast, precluding accurate   evaluation. Otherwise, the vertebral arteries appear patent and   codominant. Segmental luminal nodularity-irregularity RIGHT proximal V3   segment may reflect atypical atherosclerosis versus vasculitis.  2. As visualized, no definitive evidence of significant stenosis,   occlusion or dissection bilateral extracranial carotid arteries.  3. No evidence of large vessel occlusion, definitive aneurysm or vascular   malformation intracranial circulation. Severe segmental stenosis P3   segment RIGHT posterior cerebral artery as well as abrupt truncation of a   probable M4 cortical branch LEFT middle cerebral artery, as above.  4. Additional findings described in detail above, including evidence of a   dehiscent right jugular bulb; the significance of which should be   determined on a clinical basis.    In light of the above findings, recommend noncontrast MRI of the brain   for further evaluation, provided there are no medical contraindications.   Results of CT brain and recommendations discussed with Dr. Jackson at   approximately 11:00 AM the day of this exam.    --- End of Report ---            ELENA STEELE M.D., ATTENDING RADIOLOGIST  This document has been electronically signed. Dec  9 2023 11:38AM    < end of copied text >   Patient is a 73y old  Female who presents with a chief complaint of TIA (09 Dec 2023 14:40)      HPI:  Transient difficulty speaking and weakness right side now better; In the car in which she was brought tto the hospital, she was unable to find objects in her bag; here too she was unable to find valuables in her bag to give to her  for safekeeping.  She was able to access records from [facility]; I have reviewed the MR head that was done in May when she presented with transient visual difficulty in one eye - the MRI showed right parietal punctate DWI image signals but also showed bilateral punctate and larger SWI low intensity of old hemosiderin   PAST MEDICAL & SURGICAL HISTORY:  Cerebrovascular accident (CVA)      H/O hyperlipidemia      No significant past surgical history          FAMILY HISTORY:        Social Hx:  Nonsmoker, no drug or alcohol use    MEDICATIONS  (STANDING):  aspirin  chewable 81 milliGRAM(s) Oral daily  atorvastatin 80 milliGRAM(s) Oral at bedtime  enoxaparin Injectable 40 milliGRAM(s) SubCutaneous every 24 hours       Allergies    ibuprofen (Other)    Intolerances        ROS: Pertinent positives in HPI, all other ROS were reviewed and are negative.      Vital Signs Last 24 Hrs  T(C): 36.4 (09 Dec 2023 16:40), Max: 36.6 (09 Dec 2023 10:06)  T(F): 97.5 (09 Dec 2023 16:40), Max: 97.9 (09 Dec 2023 10:06)  HR: 62 (09 Dec 2023 16:40) (59 - 66)  BP: 136/72 (09 Dec 2023 16:40) (133/70 - 174/93)  BP(mean): --  RR: 18 (09 Dec 2023 16:40) (18 - 20)  SpO2: 99% (09 Dec 2023 16:40) (95% - 99%)    Parameters below as of 09 Dec 2023 16:40  Patient On (Oxygen Delivery Method): room air            Constitutional: awake and alert.  HEENT: PERRLA, EOMI,   Neck: Supple.  Respiratory: Breath sounds are clear bilaterally  Cardiovascular: S1 and S2, regular rhythm  Gastrointestinal: soft, nontender  Extremities:  no edema  Vascular: Caritid Bruit - no  Musculoskeletal: no joint swelling/tenderness, no abnormal movements  Skin: No rashes    Neurological exam:  HF: A x O x 3. Appropriately interactive, normal affect. Speech fluent, No Aphasia or paraphasic errors. Mild disorder Naming /repetition intact   CN: BO, EOMI, VFF, facial sensation normal, no NLFD, tongue midline, Palate moves equally, SCM equal bilaterally  Motor: No pronator drift, Strength 5/5 in all 4 ext, normal bulk and tone, no tremor, rigidity or bradykinesia.    Sens: Intact to light touch / PP/ VS/ JS    Reflexes: Symmetric and normal . BJ 2+, BR 2+, KJ 2+, AJ 2+, downgoing toes b/l  Coord:  No FNFA, dysmetria, VIRGILIO intact   Gait/Balance: Normal/    NIHSS: 2  MRS2    1A: Level of consciousness       0= Alert; keenly responsive    1B: Ask month and age       0= Both questions right    1C: "Blink eyes" and "Squeeze Hands"       0= Performs both      2: Horizontal EOMs       0= Normal      3: Visual fields       0= No visual loss      4: Facial palsy (use grimace if obtunded)       0= Normal symmetry     )    5A: Left arm motor drift (count out loud and use fingers to show count)       0= No drift x 10 seconds       5B: Right arm motor drift       0= No drift x 10 seconds         6A: Left leg motor drift       0= No drift x 10 seconds           6B: Right leg motor drift       0= No drift x 10 seconds    7: Limb ataxia (FNF/heel-shin)       +1= Ataxia in 1 limb       8: Sensation       0= Normal, no sensory loss     9: Language/aphasia- describe the scene (on joy); name the items; read the sentences (on joy)       +1= mild-moderate aphasia some obvious changes without significant limitation           10: Dysarthria- read the words       0= Normal    11: Extinction/inattention       0= No abnormality         Labs:                        11.2   3.31  )-----------( 277      ( 09 Dec 2023 10:45 )             36.0     12-09    135  |  103  |  10  ----------------------------<  94  3.6   |  29  |  0.55    Ca    8.0<L>      09 Dec 2023 10:45    TPro  7.0  /  Alb  3.7  /  TBili  0.3  /  DBili  x   /  AST  31  /  ALT  44  /  AlkPhos  68  12-09        PT/INR - ( 09 Dec 2023 10:45 )   PT: 10.3 sec;   INR: 0.90 ratio         PTT - ( 09 Dec 2023 10:45 )  PTT:28.1 sec    Radiology report:  - CT head:    < from: CT Brain Perfusion Maps Stroke (12.09.23 @ 10:48) >    ACC: [id number] EXAM:  CT ANGIO BRAIN STROKE PROTC IC   ORDERED BY: KENDAL JACKSON     ACC: [id number] EXAM:  CT ANGIO NECK STROKE PROTCL IC   ORDERED BY: KENDAL JACKSON     ACC: [id number] EXAM:  CT BRAIN STROKE PROTOCOL   ORDERED BY: KENDAL JACKSON     ACC: [id number] EXAM:  CT BRAIN PERFUSION MAPS STROKE   ORDERED BY: KENDAL JACKSON     PROCEDURE DATE:  12/09/2023          INTERPRETATION:  CLINICAL STATEMENT: Stroke code. Clinical concern for   CVA.    TECHNIQUE: Noncontrast CT brain axial plane. CTA brain and neck. CT   perfusion: After the administration of 50 cc of Omnipaque 300 serial thin   sections were obtained through the brain the purposes of evaluating CT   perfusion. Raw data was sent to the ischemia rapid view software for   postprocessing. 90cc Omnipaque 350 Intravenous contrast was administered   for the CTA compartment. 2-D MIP and 3-D volume rendering images. Degree   of extracranial carotid stenosis calculated utilizing NASCET criteria.   Artifact related to regions of dense venous contrast results in   suboptimal evaluation, obscuring the adjacent structures.  COMPARISON: CT brain 6/27/2022.    FINDINGS:    CT BRAIN    Mucosal thickening visualized paranasal sinuses. No air-fluid levels or   opacification. Incompletely pneumatized with partial opacification right   mastoid air cells. Left mastoid air cells and bilateral middle ear   regions well-aerated. No aggressive calvarial lesion.    Again seen is asymmetric size of the left greater than right lateral   ventricles, likely anatomic for the patient. Cavum septum pellucidum with   rightward deviation. Moderate patchy white matter hypodensities again   noted; a nonspecific finding which statistically reflects chronic   microvascular ischemic change. Newly visualized focus of moderately   hypoattenuation with cytotoxic edema right parieto-occipital region, with   sulcal effacement-mass effect.    No evidence of extra-axial collection or acute hemorrhage.    CTA NECK    Aortic arch and great vessel origins grossly unremarkable, however the   left subclavian origin is partially obscured by artifact related to   adjacent dense venous contrast.    Left proximal-mid vertebral artery obscured by adjacent dense venous   contrast, precluding evaluation. Otherwise, the vertebral arteries appear   patent and codominant. Segmental luminal irregularity-nodular artery   right proximal V3 segment.    Left extracranial carotid arteries largely obscured by artifact related   to adjacent dense venous contrast. Otherwise as visualized, there is no   definitive evidence of significant stenosis, occlusion or dissection   bilateral extracranial carotid arteries.    CTA BRAIN    Evidence of severe segmental stenosis P3 segment RIGHT posterior cerebral   artery (series 7 image 158-163). Moderate segmental stenosis P2 segment   LEFT posterior cerebral artery. No evidence of significant stenosis   distal vertebral and basilar arteries. Prepped    Abrupt truncation of a probable M4 cortical branch LEFT middle cerebral   artery(series 7 image 107-110) ipsilateral posterior frontal region.   Mild calcified plaque bilateral carotid siphons, without significant   stenosis.    No evidence of large vessel occlusion, definitive aneurysm or vascular   malformation.    CT PERFUSION    No core infarct.    CBF<30% volume: 0 ml  Tmax>6.0 s volume: 4 ml  Mismatch volume: 4 ml  Mismatch ratio: Infinite.    OTHER    No evidence of dural venous or jugular vein thrombosis. High riding right   jugular bulb with evidence of dehiscence of the ipsilateral sigmoid   plate. Bilateral lens replacement surgery. No suspicious upper lung mass.   Multifocal degenerative change. No aggressive osseous lesion.    IMPRESSION:    CT BRAIN  1. No evidence of acute hemorrhage.  2. Evidence of moderate chronic microvascular ischemic change with newly   visualized focus of moderate hypoattenuation-cytotoxic edema right   parieto-occipital region; a finding suspicious for acute-subacute   ischemia in the appropriate clinical setting.  3. Additional findings described in detail above.    CT PERFUSION  1. No predicted core infarct.  2. Evidence of active ischemia-tissue at risk right parieto-occipital   region (4 mL).    CTA  1. Partial obscuration of the left-sided neck vasculature secondary to  artifact related to adjacent dense venous contrast, precluding accurate   evaluation. Otherwise, the vertebral arteries appear patent and   codominant. Segmental luminal nodularity-irregularity RIGHT proximal V3   segment may reflect atypical atherosclerosis versus vasculitis.  2. As visualized, no definitive evidence of significant stenosis,   occlusion or dissection bilateral extracranial carotid arteries.  3. No evidence of large vessel occlusion, definitive aneurysm or vascular   malformation intracranial circulation. Severe segmental stenosis P3   segment RIGHT posterior cerebral artery as well as abrupt truncation of a   probable M4 cortical branch LEFT middle cerebral artery, as above.  4. Additional findings described in detail above, including evidence of a   dehiscent right jugular bulb; the significance of which should be   determined on a clinical basis.    In light of the above findings, recommend noncontrast MRI of the brain   for further evaluation, provided there are no medical contraindications.   Results of CT brain and recommendations discussed with Dr. Jackson at   approximately 11:00 AM the day of this exam.    --- End of Report ---            ELENA STEELE M.D., ATTENDING RADIOLOGIST  This document has been electronically signed. Dec  9 2023 11:38AM    < end of copied text >

## 2023-12-09 NOTE — PATIENT PROFILE ADULT - FALL HARM RISK - HARM RISK INTERVENTIONS
Assistance with ambulation/Assistance OOB with selected safe patient handling equipment/Communicate Risk of Fall with Harm to all staff/Discuss with provider need for PT consult/Monitor gait and stability/Provide patient with walking aids - walker, cane, crutches/Reinforce activity limits and safety measures with patient and family/Tailored Fall Risk Interventions/Visual Cue: Yellow wristband and red socks/Bed in lowest position, wheels locked, appropriate side rails in place/Call bell, personal items and telephone in reach/Instruct patient to call for assistance before getting out of bed or chair/Non-slip footwear when patient is out of bed/[place] to call system/Physically safe environment - no spills, clutter or unnecessary equipment/Purposeful Proactive Rounding/Room/bathroom lighting operational, light cord in reach Assistance with ambulation/Assistance OOB with selected safe patient handling equipment/Communicate Risk of Fall with Harm to all staff/Discuss with provider need for PT consult/Monitor gait and stability/Provide patient with walking aids - walker, cane, crutches/Reinforce activity limits and safety measures with patient and family/Tailored Fall Risk Interventions/Visual Cue: Yellow wristband and red socks/Bed in lowest position, wheels locked, appropriate side rails in place/Call bell, personal items and telephone in reach/Instruct patient to call for assistance before getting out of bed or chair/Non-slip footwear when patient is out of bed/[place] to call system/Physically safe environment - no spills, clutter or unnecessary equipment/Purposeful Proactive Rounding/Room/bathroom lighting operational, light cord in reach

## 2023-12-09 NOTE — H&P ADULT - ASSESSMENT
72 y/o F, from home with PMHx of three previous TIAs (no residual deficits), HLD, and migraines who presents with difficulty speaking. Symptoms resolved in ED. Admitted for TIA workup.

## 2023-12-10 LAB
A1C WITH ESTIMATED AVERAGE GLUCOSE RESULT: 5.6 % — SIGNIFICANT CHANGE UP (ref 4–5.6)
A1C WITH ESTIMATED AVERAGE GLUCOSE RESULT: 5.6 % — SIGNIFICANT CHANGE UP (ref 4–5.6)
ALBUMIN SERPL ELPH-MCNC: 3.2 G/DL — LOW (ref 3.5–5)
ALBUMIN SERPL ELPH-MCNC: 3.2 G/DL — LOW (ref 3.5–5)
ALP SERPL-CCNC: 58 U/L — SIGNIFICANT CHANGE UP (ref 40–120)
ALP SERPL-CCNC: 58 U/L — SIGNIFICANT CHANGE UP (ref 40–120)
ALT FLD-CCNC: 36 U/L DA — SIGNIFICANT CHANGE UP (ref 10–60)
ALT FLD-CCNC: 36 U/L DA — SIGNIFICANT CHANGE UP (ref 10–60)
ANION GAP SERPL CALC-SCNC: 6 MMOL/L — SIGNIFICANT CHANGE UP (ref 5–17)
ANION GAP SERPL CALC-SCNC: 6 MMOL/L — SIGNIFICANT CHANGE UP (ref 5–17)
AST SERPL-CCNC: 25 U/L — SIGNIFICANT CHANGE UP (ref 10–40)
AST SERPL-CCNC: 25 U/L — SIGNIFICANT CHANGE UP (ref 10–40)
BASOPHILS # BLD AUTO: 0.04 K/UL — SIGNIFICANT CHANGE UP (ref 0–0.2)
BASOPHILS # BLD AUTO: 0.04 K/UL — SIGNIFICANT CHANGE UP (ref 0–0.2)
BASOPHILS NFR BLD AUTO: 1.1 % — SIGNIFICANT CHANGE UP (ref 0–2)
BASOPHILS NFR BLD AUTO: 1.1 % — SIGNIFICANT CHANGE UP (ref 0–2)
BILIRUB SERPL-MCNC: 0.4 MG/DL — SIGNIFICANT CHANGE UP (ref 0.2–1.2)
BILIRUB SERPL-MCNC: 0.4 MG/DL — SIGNIFICANT CHANGE UP (ref 0.2–1.2)
BUN SERPL-MCNC: 13 MG/DL — SIGNIFICANT CHANGE UP (ref 7–18)
BUN SERPL-MCNC: 13 MG/DL — SIGNIFICANT CHANGE UP (ref 7–18)
CALCIUM SERPL-MCNC: 8.1 MG/DL — LOW (ref 8.4–10.5)
CALCIUM SERPL-MCNC: 8.1 MG/DL — LOW (ref 8.4–10.5)
CHLORIDE SERPL-SCNC: 107 MMOL/L — SIGNIFICANT CHANGE UP (ref 96–108)
CHLORIDE SERPL-SCNC: 107 MMOL/L — SIGNIFICANT CHANGE UP (ref 96–108)
CHOLEST SERPL-MCNC: 184 MG/DL — SIGNIFICANT CHANGE UP
CHOLEST SERPL-MCNC: 184 MG/DL — SIGNIFICANT CHANGE UP
CO2 SERPL-SCNC: 23 MMOL/L — SIGNIFICANT CHANGE UP (ref 22–31)
CO2 SERPL-SCNC: 23 MMOL/L — SIGNIFICANT CHANGE UP (ref 22–31)
CREAT SERPL-MCNC: 0.58 MG/DL — SIGNIFICANT CHANGE UP (ref 0.5–1.3)
CREAT SERPL-MCNC: 0.58 MG/DL — SIGNIFICANT CHANGE UP (ref 0.5–1.3)
EGFR: 94 ML/MIN/1.73M2 — SIGNIFICANT CHANGE UP
EGFR: 94 ML/MIN/1.73M2 — SIGNIFICANT CHANGE UP
EOSINOPHIL # BLD AUTO: 0.16 K/UL — SIGNIFICANT CHANGE UP (ref 0–0.5)
EOSINOPHIL # BLD AUTO: 0.16 K/UL — SIGNIFICANT CHANGE UP (ref 0–0.5)
EOSINOPHIL NFR BLD AUTO: 4.4 % — SIGNIFICANT CHANGE UP (ref 0–6)
EOSINOPHIL NFR BLD AUTO: 4.4 % — SIGNIFICANT CHANGE UP (ref 0–6)
ESTIMATED AVERAGE GLUCOSE: 114 MG/DL — SIGNIFICANT CHANGE UP (ref 68–114)
ESTIMATED AVERAGE GLUCOSE: 114 MG/DL — SIGNIFICANT CHANGE UP (ref 68–114)
GLUCOSE SERPL-MCNC: 92 MG/DL — SIGNIFICANT CHANGE UP (ref 70–99)
GLUCOSE SERPL-MCNC: 92 MG/DL — SIGNIFICANT CHANGE UP (ref 70–99)
HCT VFR BLD CALC: 35.5 % — SIGNIFICANT CHANGE UP (ref 34.5–45)
HCT VFR BLD CALC: 35.5 % — SIGNIFICANT CHANGE UP (ref 34.5–45)
HCV AB S/CO SERPL IA: 0.05 S/CO — SIGNIFICANT CHANGE UP (ref 0–0.99)
HCV AB S/CO SERPL IA: 0.05 S/CO — SIGNIFICANT CHANGE UP (ref 0–0.99)
HCV AB SERPL-IMP: SIGNIFICANT CHANGE UP
HCV AB SERPL-IMP: SIGNIFICANT CHANGE UP
HDLC SERPL-MCNC: 80 MG/DL — SIGNIFICANT CHANGE UP
HDLC SERPL-MCNC: 80 MG/DL — SIGNIFICANT CHANGE UP
HGB BLD-MCNC: 11.3 G/DL — LOW (ref 11.5–15.5)
HGB BLD-MCNC: 11.3 G/DL — LOW (ref 11.5–15.5)
IMM GRANULOCYTES NFR BLD AUTO: 0 % — SIGNIFICANT CHANGE UP (ref 0–0.9)
IMM GRANULOCYTES NFR BLD AUTO: 0 % — SIGNIFICANT CHANGE UP (ref 0–0.9)
LIPID PNL WITH DIRECT LDL SERPL: 92 MG/DL — SIGNIFICANT CHANGE UP
LIPID PNL WITH DIRECT LDL SERPL: 92 MG/DL — SIGNIFICANT CHANGE UP
LYMPHOCYTES # BLD AUTO: 1.14 K/UL — SIGNIFICANT CHANGE UP (ref 1–3.3)
LYMPHOCYTES # BLD AUTO: 1.14 K/UL — SIGNIFICANT CHANGE UP (ref 1–3.3)
LYMPHOCYTES # BLD AUTO: 31.2 % — SIGNIFICANT CHANGE UP (ref 13–44)
LYMPHOCYTES # BLD AUTO: 31.2 % — SIGNIFICANT CHANGE UP (ref 13–44)
MAGNESIUM SERPL-MCNC: 2.3 MG/DL — SIGNIFICANT CHANGE UP (ref 1.6–2.6)
MAGNESIUM SERPL-MCNC: 2.3 MG/DL — SIGNIFICANT CHANGE UP (ref 1.6–2.6)
MCHC RBC-ENTMCNC: 30.5 PG — SIGNIFICANT CHANGE UP (ref 27–34)
MCHC RBC-ENTMCNC: 30.5 PG — SIGNIFICANT CHANGE UP (ref 27–34)
MCHC RBC-ENTMCNC: 31.8 GM/DL — LOW (ref 32–36)
MCHC RBC-ENTMCNC: 31.8 GM/DL — LOW (ref 32–36)
MCV RBC AUTO: 95.7 FL — SIGNIFICANT CHANGE UP (ref 80–100)
MCV RBC AUTO: 95.7 FL — SIGNIFICANT CHANGE UP (ref 80–100)
MONOCYTES # BLD AUTO: 0.35 K/UL — SIGNIFICANT CHANGE UP (ref 0–0.9)
MONOCYTES # BLD AUTO: 0.35 K/UL — SIGNIFICANT CHANGE UP (ref 0–0.9)
MONOCYTES NFR BLD AUTO: 9.6 % — SIGNIFICANT CHANGE UP (ref 2–14)
MONOCYTES NFR BLD AUTO: 9.6 % — SIGNIFICANT CHANGE UP (ref 2–14)
NEUTROPHILS # BLD AUTO: 1.96 K/UL — SIGNIFICANT CHANGE UP (ref 1.8–7.4)
NEUTROPHILS # BLD AUTO: 1.96 K/UL — SIGNIFICANT CHANGE UP (ref 1.8–7.4)
NEUTROPHILS NFR BLD AUTO: 53.7 % — SIGNIFICANT CHANGE UP (ref 43–77)
NEUTROPHILS NFR BLD AUTO: 53.7 % — SIGNIFICANT CHANGE UP (ref 43–77)
NON HDL CHOLESTEROL: 104 MG/DL — SIGNIFICANT CHANGE UP
NON HDL CHOLESTEROL: 104 MG/DL — SIGNIFICANT CHANGE UP
NRBC # BLD: 0 /100 WBCS — SIGNIFICANT CHANGE UP (ref 0–0)
NRBC # BLD: 0 /100 WBCS — SIGNIFICANT CHANGE UP (ref 0–0)
PHOSPHATE SERPL-MCNC: 2.7 MG/DL — SIGNIFICANT CHANGE UP (ref 2.5–4.5)
PHOSPHATE SERPL-MCNC: 2.7 MG/DL — SIGNIFICANT CHANGE UP (ref 2.5–4.5)
PLATELET # BLD AUTO: 268 K/UL — SIGNIFICANT CHANGE UP (ref 150–400)
PLATELET # BLD AUTO: 268 K/UL — SIGNIFICANT CHANGE UP (ref 150–400)
POTASSIUM SERPL-MCNC: 4.1 MMOL/L — SIGNIFICANT CHANGE UP (ref 3.5–5.3)
POTASSIUM SERPL-MCNC: 4.1 MMOL/L — SIGNIFICANT CHANGE UP (ref 3.5–5.3)
POTASSIUM SERPL-SCNC: 4.1 MMOL/L — SIGNIFICANT CHANGE UP (ref 3.5–5.3)
POTASSIUM SERPL-SCNC: 4.1 MMOL/L — SIGNIFICANT CHANGE UP (ref 3.5–5.3)
PROT SERPL-MCNC: 6.2 G/DL — SIGNIFICANT CHANGE UP (ref 6–8.3)
PROT SERPL-MCNC: 6.2 G/DL — SIGNIFICANT CHANGE UP (ref 6–8.3)
RBC # BLD: 3.71 M/UL — LOW (ref 3.8–5.2)
RBC # BLD: 3.71 M/UL — LOW (ref 3.8–5.2)
RBC # FLD: 13.7 % — SIGNIFICANT CHANGE UP (ref 10.3–14.5)
RBC # FLD: 13.7 % — SIGNIFICANT CHANGE UP (ref 10.3–14.5)
SODIUM SERPL-SCNC: 136 MMOL/L — SIGNIFICANT CHANGE UP (ref 135–145)
SODIUM SERPL-SCNC: 136 MMOL/L — SIGNIFICANT CHANGE UP (ref 135–145)
TRIGL SERPL-MCNC: 61 MG/DL — SIGNIFICANT CHANGE UP
TRIGL SERPL-MCNC: 61 MG/DL — SIGNIFICANT CHANGE UP
TSH SERPL-MCNC: 2.97 UU/ML — SIGNIFICANT CHANGE UP (ref 0.34–4.82)
TSH SERPL-MCNC: 2.97 UU/ML — SIGNIFICANT CHANGE UP (ref 0.34–4.82)
WBC # BLD: 3.65 K/UL — LOW (ref 3.8–10.5)
WBC # BLD: 3.65 K/UL — LOW (ref 3.8–10.5)
WBC # FLD AUTO: 3.65 K/UL — LOW (ref 3.8–10.5)
WBC # FLD AUTO: 3.65 K/UL — LOW (ref 3.8–10.5)

## 2023-12-10 RX ADMIN — ATORVASTATIN CALCIUM 80 MILLIGRAM(S): 80 TABLET, FILM COATED ORAL at 21:07

## 2023-12-10 RX ADMIN — Medication 30 MILLILITER(S): at 23:24

## 2023-12-10 RX ADMIN — Medication 81 MILLIGRAM(S): at 14:22

## 2023-12-10 NOTE — PHYSICAL THERAPY INITIAL EVALUATION ADULT - ADDITIONAL COMMENTS
Patient lives in private house with a pprox 4 steps outside and 7 steps inside the house.  Patient is independent with transfers, ADL and indoor/outdoor ambulation with straight cane.

## 2023-12-10 NOTE — CONSULT NOTE ADULT - SUBJECTIVE AND OBJECTIVE BOX
MR#[id number]  PATIENT NAME:SUDHA METZ    DATE OF SERVICE: 12-10-23   Patient was seen and examined by Karthikeyan Subramanian MD on    12-10-23   Interim events noted.Consultant notes ,Labs,Telemetry reviewed by me       HOSPITAL COURSE: HPI:  This is a 72 y/o F, from home with PMHx of three previous TIAs (no residual deficits), HLD, and migraines who presents with difficulty speaking. Pt states this morning she had difficulty saying any words which prompted her  to bring her to the hospital. Speech improved once she arrived to the hospital. Pt denies any recent illnesses, CP, fever, SOB, N/V/D, headaches, numbness or tingling.      (09 Dec 2023 14:40)      INTERIM EVENTS:Patient seen at bedside ,interim events noted.      PMH -reviewed admission note, no change since admission  HEART FAILURE: Acute[ ]Chronic[ ] Systolic[ ] Diastolic[ ] Combined Systolic and Diastolic[ ]  CAD[ ] CABG[ ] PCI[ ]  DEVICES[ ] PPM[ ] ICD[ ] ILR[ ]  ATRIAL FIBRILLATION[ ] Paroxysmal[ ] Permanent[ ] CHADS2-[  ]  TOMER[ ] CKD1[ ] CKD2[ ] CKD3[ ] CKD4[ ] ESRD[ ]  COPD[ ] HTN[ ]   DM[ ] Type1[ ] Type 2[ ]   CVA[ ] Paresis[ ]    AMBULATION: Assisted[ ] Cane/walker[ ] Independent[ ]    MEDICATIONS  (STANDING):  aspirin  chewable 81 milliGRAM(s) Oral daily  atorvastatin 80 milliGRAM(s) Oral at bedtime  enoxaparin Injectable 40 milliGRAM(s) SubCutaneous every 24 hours    MEDICATIONS  (PRN):            REVIEW OF SYSTEMS:  Constitutional: [ ] fever, [ ]weight loss,  [ ]fatigue [ ]weight gain  Eyes: [ ] visual changes  Respiratory: [ ]shortness of breath;  [ ] cough, [ ]wheezing, [ ]chills, [ ]hemoptysis  Cardiovascular: [ ] chest pain, [ ]palpitations, [ ]dizziness,  [ ]leg swelling[ ]orthopnea[ ]PND  Gastrointestinal: [ ] abdominal pain, [ ]nausea, [ ]vomiting,  [ ]diarrhea [ ]Constipation [ ]Melena  Genitourinary: [ ] dysuria, [ ] hematuria [ ]Fan  Neurologic: [ ] headaches [ ] tremors[ ]weakness [ ]Paralysis Right[ ] Left[ ]  Skin: [ ] itching, [ ]burning, [ ] rashes  Endocrine: [ ] heat or cold intolerance  Musculoskeletal: [ ] joint pain or swelling; [ ] muscle, back, or extremity pain  Psychiatric: [ ] depression, [ ]anxiety, [ ]mood swings, or [ ]difficulty sleeping  Hematologic: [ ] easy bruising, [ ] bleeding gums    [ ] All remaining systems negative except as per above.   [ ]Unable to obtain.  [x] No change in ROS since admission      Vital Signs Last 24 Hrs  T(C): 36.9 (10 Dec 2023 18:12), Max: 36.9 (10 Dec 2023 18:12)  T(F): 98.4 (10 Dec 2023 18:12), Max: 98.4 (10 Dec 2023 18:12)  HR: 66 (10 Dec 2023 18:12) (51 - 71)  BP: 155/99 (10 Dec 2023 18:12) (111/69 - 155/99)  BP(mean): --  RR: 16 (10 Dec 2023 18:12) (16 - 18)  SpO2: 100% (10 Dec 2023 18:12) (98% - 100%)    Parameters below as of 10 Dec 2023 18:12  Patient On (Oxygen Delivery Method): room air      I&O's Summary      PHYSICAL EXAM:  General: No acute distress BMI-  HEENT: EOMI, PERRL  Neck: Supple, [ ] JVD  Lungs: Equal air entry bilaterally; [ ] rales [ ] wheezing [ ] rhonchi  Heart: Regular rate and rhythm; [x ] murmur   2/6 [ x] systolic [ ] diastolic [ ] radiation[ ] rubs [ ]  gallops  Abdomen: Nontender, bowel sounds present  Extremities: No clubbing, cyanosis, [ ] edema [ ]Pulses  equal and intact  Nervous system:  Alert & Oriented X3, no focal deficits  Psychiatric: Normal affect  Skin: No rashes or lesions    LABS:  12-10    136  |  107  |  13  ----------------------------<  92  4.1   |  23  |  0.58    Ca    8.1<L>      10 Dec 2023 06:33  Phos  2.7     12-10  Mg     2.3     12-10    TPro  6.2  /  Alb  3.2<L>  /  TBili  0.4  /  DBili  x   /  AST  25  /  ALT  36  /  AlkPhos  58  12-10    Creatinine Trend: 0.58<--, 0.55<--                        11.3   3.65  )-----------( 268      ( 10 Dec 2023 06:33 )             35.5     PT/INR - ( 09 Dec 2023 10:45 )   PT: 10.3 sec;   INR: 0.90 ratio         PTT - ( 09 Dec 2023 10:45 )  PTT:28.1 sec           MR#[id number]  PATIENT NAME:SUDHA METZ    DATE OF SERVICE: 12-10-23   Patient was seen and examined by Karthikeyan Subramanian MD on    12-10-23   Interim events noted.Consultant notes ,Labs,Telemetry reviewed by me       HOSPITAL COURSE: HPI:  This is a 72 y/o F, from home with PMHx of three previous TIAs (no residual deficits), HLD, and migraines who presents with difficulty speaking. Pt states this morning she had difficulty saying any words which prompted her  to bring her to the hospital. Speech improved once she arrived to the hospital. Pt denies any recent illnesses, CP, fever, SOB, N/V/D, headaches, numbness or tingling.      (09 Dec 2023 14:40)      INTERIM EVENTS:Patient seen at bedside ,interim events noted.      PMH -reviewed admission note, no change since admission  HEART FAILURE: Acute[ ]Chronic[ ] Systolic[ ] Diastolic[ ] Combined Systolic and Diastolic[ ]  CAD[ ] CABG[ ] PCI[ ]  DEVICES[ ] PPM[ ] ICD[ ] ILR[ ]  ATRIAL FIBRILLATION[ ] Paroxysmal[ ] Permanent[ ] CHADS2-[  ]  TOMER[ ] CKD1[ ] CKD2[ ] CKD3[ ] CKD4[ ] ESRD[ ]  COPD[ ] HTN[ ]   DM[ ] Type1[ ] Type 2[ ]   CVA[ ] Paresis[ ]    AMBULATION: Assisted[ ] Cane/walker[ ] Independent[ ]    MEDICATIONS  (STANDING):  aspirin  chewable 81 milliGRAM(s) Oral daily  atorvastatin 80 milliGRAM(s) Oral at bedtime  enoxaparin Injectable 40 milliGRAM(s) SubCutaneous every 24 hours    MEDICATIONS  (PRN):            REVIEW OF SYSTEMS:  Constitutional: [ ] fever, [ ]weight loss,  [ ]fatigue [ ]weight gain  Eyes: [ ] visual changes  Respiratory: [ ]shortness of breath;  [ ] cough, [ ]wheezing, [ ]chills, [ ]hemoptysis  Cardiovascular: [ ] chest pain, [ ]palpitations, [ ]dizziness,  [ ]leg swelling[ ]orthopnea[ ]PND  Gastrointestinal: [ ] abdominal pain, [ ]nausea, [ ]vomiting,  [ ]diarrhea [ ]Constipation [ ]Melena  Genitourinary: [ ] dysuria, [ ] hematuria [ ]Fan  Neurologic: [ ] headaches [ ] tremors[ ]weakness [ ]Paralysis Right[ ] Left[ ]  Skin: [ ] itching, [ ]burning, [ ] rashes  Endocrine: [ ] heat or cold intolerance  Musculoskeletal: [ ] joint pain or swelling; [ ] muscle, back, or extremity pain  Psychiatric: [ ] depression, [ ]anxiety, [ ]mood swings, or [ ]difficulty sleeping  Hematologic: [ ] easy bruising, [ ] bleeding gums    [ ] All remaining systems negative except as per above.   [ ]Unable to obtain.  [x] No change in ROS since admission      Vital Signs Last 24 Hrs  T(C): 36.9 (10 Dec 2023 18:12), Max: 36.9 (10 Dec 2023 18:12)  T(F): 98.4 (10 Dec 2023 18:12), Max: 98.4 (10 Dec 2023 18:12)  HR: 66 (10 Dec 2023 18:12) (51 - 71)  BP: 155/99 (10 Dec 2023 18:12) (111/69 - 155/99)  BP(mean): --  RR: 16 (10 Dec 2023 18:12) (16 - 18)  SpO2: 100% (10 Dec 2023 18:12) (98% - 100%)    Parameters below as of 10 Dec 2023 18:12  Patient On (Oxygen Delivery Method): room air      I&O's Summary      PHYSICAL EXAM:  General: No acute distress BMI-  HEENT: EOMI, PERRL  Neck: Supple, [ ] JVD  Lungs: Equal air entry bilaterally; [ ] rales [ ] wheezing [ ] rhonchi  Heart: Regular rate and rhythm; [x ] murmur   2/6 [ x] systolic [ ] diastolic [ ] radiation[ ] rubs [ ]  gallops  Abdomen: Nontender, bowel sounds present  Extremities: No clubbing, cyanosis, [ ] edema [ ]Pulses  equal and intact  Nervous system:  Alert & Oriented X3, no focal deficits  Psychiatric: Normal affect  Skin: No rashes or lesions    LABS:  12-10    136  |  107  |  13  ----------------------------<  92  4.1   |  23  |  0.58    Ca    8.1<L>      10 Dec 2023 06:33  Phos  2.7     12-10  Mg     2.3     12-10    TPro  6.2  /  Alb  3.2<L>  /  TBili  0.4  /  DBili  x   /  AST  25  /  ALT  36  /  AlkPhos  58  12-10    Creatinine Trend: 0.58<--, 0.55<--                        11.3   3.65  )-----------( 268      ( 10 Dec 2023 06:33 )             35.5     PT/INR - ( 09 Dec 2023 10:45 )   PT: 10.3 sec;   INR: 0.90 ratio         PTT - ( 09 Dec 2023 10:45 )  PTT:28.1 sec

## 2023-12-10 NOTE — CONSULT NOTE ADULT - ASSESSMENT
72 y/o F, from home with PMHx of three previous TIAs (no residual deficits), HLD, and migraines who presents with difficulty speaking. Symptoms resolved in ED. Admitted for TIA workup.    #  TIA (transient ischemic attack).   ·  Plan: Pt presents with difficulty forming words, resolved upon arrival to ED  CT Head: Evidence of moderate chronic microvascular ischemic change with newly visualized focus of moderate hypoattenuation-cytotoxic edema right parieto-occipital region; a finding suspicious for acute-subacute ischemia in the appropriate clinical setting.  NIHSS 1 on admission (aphasia)  - C/w Neuro checks q4 hours  - C/w TELE monitoring  - C/w ASA, Statin, Plavix  - F/u Echo  - F/u MRI  - f/u neuro 74 y/o F, from home with PMHx of three previous TIAs (no residual deficits), HLD, and migraines who presents with difficulty speaking. Symptoms resolved in ED. Admitted for TIA workup.    #  TIA (transient ischemic attack).   ·  Plan: Pt presents with difficulty forming words, resolved upon arrival to ED  CT Head: Evidence of moderate chronic microvascular ischemic change with newly visualized focus of moderate hypoattenuation-cytotoxic edema right parieto-occipital region; a finding suspicious for acute-subacute ischemia in the appropriate clinical setting.  NIHSS 1 on admission (aphasia)  - C/w Neuro checks q4 hours  - C/w TELE monitoring  - C/w ASA, Statin, Plavix  - F/u Echo  - F/u MRI  - f/u neuro

## 2023-12-10 NOTE — PROGRESS NOTE ADULT - PROBLEM SELECTOR PLAN 1
Pt presents with difficulty forming words, resolved upon arrival to ED  CT Head: Evidence of moderate chronic microvascular ischemic change with newly visualized focus of moderate hypoattenuation-cytotoxic edema right parieto-occipital region; a finding suspicious for acute-subacute ischemia in the appropriate clinical setting.  NIHSS 1 on admission (aphasia)  - C/w Neuro checks q4 hours  - C/w TELE monitoring  - C/w ASA, Statin, Plavix  - F/u Echo  - F/u MRI  - F/u A1c, Lipid Panel  Neuro Dr. Jimenez Consulted

## 2023-12-10 NOTE — PROGRESS NOTE ADULT - SUBJECTIVE AND OBJECTIVE BOX
PGY-1 Progress Note discussed with attending    PAGER #: [[phone]] TILL 5:00 PM  PLEASE CONTACT ON CALL TEAM:  - On Call Team (Please refer to Dustin) FROM 5:00 PM - 8:30PM  - Nightfloat Team FROM 8:30 -7:30 AM    INTERVAL HPI/OVERNIGHT EVENTS:   No overnight events. Pt was examined in the morning at bedside with spouse and son present. Pt is AAOx3 and in NAD. Pt denies any symptoms     REVIEW OF SYSTEMS:  CONSTITUTIONAL: No fever, chills,  or fatigue  RESPIRATORY: No cough, wheezing, No shortness of breath  CARDIOVASCULAR: No chest pain, palpitations,  leg swelling  GASTROINTESTINAL: No abdominal pain. No nausea, vomiting, or hematemesis; No diarrhea or constipation. No bloody or black stool  GENITOURINARY: No dysuria or hematuria, urinary frequency  NEUROLOGICAL: No headaches, dizziness  SKIN: No itching, burning, rashes, or lesions     Vital Signs Last 24 Hrs  T(C): 36.4 (10 Dec 2023 06:07), Max: 36.5 (09 Dec 2023 18:52)  T(F): 97.5 (10 Dec 2023 06:07), Max: 97.7 (09 Dec 2023 18:52)  HR: 62 (10 Dec 2023 11:08) (51 - 65)  BP: 119/81 (10 Dec 2023 11:08) (119/81 - 145/75)  BP(mean): 91 (09 Dec 2023 18:52) (91 - 91)  RR: 18 (10 Dec 2023 06:07) (17 - 20)  SpO2: 98% (10 Dec 2023 11:08) (97% - 99%)    Parameters below as of 10 Dec 2023 06:07  Patient On (Oxygen Delivery Method): room air        PHYSICAL EXAMINATION:  GENERAL: NAD,   HEAD:  Atraumatic, Normocephalic  EYES:  PERRLA,  conjunctiva and sclera clear  NECK: Supple, No JVD,   CHEST/LUNG: Clear to auscultation. Clear to percussion bilaterally; No rales, rhonchi, wheezing, or rubs  HEART: Regular rate and rhythm; No murmurs, rubs, or gallops  ABDOMEN: Soft, Nontender, Nondistended; Bowel sounds present, No Rovsing's sign   NERVOUS SYSTEM:  Alert & Oriented X3,    EXTREMITIES:  2+ Peripheral Pulses, No clubbing, cyanosis, or edema  CALF: No Calf tenderness   SKIN: warm dry                          11.3   3.65  )-----------( 268      ( 10 Dec 2023 06:33 )             35.5     12-10    136  |  107  |  13  ----------------------------<  92  4.1   |  23  |  0.58    Ca    8.1<L>      10 Dec 2023 06:33  Phos  2.7     12-10  Mg     2.3     12-10    TPro  6.2  /  Alb  3.2<L>  /  TBili  0.4  /  DBili  x   /  AST  25  /  ALT  36  /  AlkPhos  58  12-10    LIVER FUNCTIONS - ( 10 Dec 2023 06:33 )  Alb: 3.2 g/dL / Pro: 6.2 g/dL / ALK PHOS: 58 U/L / ALT: 36 U/L DA / AST: 25 U/L / GGT: x               PT/INR - ( 09 Dec 2023 10:45 )   PT: 10.3 sec;   INR: 0.90 ratio         PTT - ( 09 Dec 2023 10:45 )  PTT:28.1 sec    CAPILLARY BLOOD GLUCOSE      RADIOLOGY & ADDITIONAL TESTS:                   PGY-1 Progress Note discussed with attending    PAGER #: [[phone]] TILL 5:00 PM  PLEASE CONTACT ON CALL TEAM:  - On Call Team (Please refer to Dustin) FROM 5:00 PM - 8:30PM  - Nightfloat Team FROM 8:30 -7:30 AM    INTERVAL HPI/OVERNIGHT EVENTS:   No overnight events. Pt was examined in the morning at bedside with spouse and son present. Pt is AAOx3 and in NAD. Pt denies any symptoms     REVIEW OF SYSTEMS:  CONSTITUTIONAL: No fever, chills,  or fatigue  RESPIRATORY: No cough, wheezing, No shortness of breath  CARDIOVASCULAR: No chest pain, palpitations,  leg swelling  GASTROINTESTINAL: No abdominal pain. No nausea, vomiting, or hematemesis; No diarrhea or constipation. No bloody or black stool  GENITOURINARY: No dysuria or hematuria, urinary frequency  NEUROLOGICAL: No headaches, dizziness  SKIN: No itching, burning, rashes, or lesions     Vital Signs Last 24 Hrs  T(C): 36.4 (10 Dec 2023 06:07), Max: 36.5 (09 Dec 2023 18:52)  T(F): 97.5 (10 Dec 2023 06:07), Max: 97.7 (09 Dec 2023 18:52)  HR: 62 (10 Dec 2023 11:08) (51 - 65)  BP: 119/81 (10 Dec 2023 11:08) (119/81 - 145/75)  BP(mean): 91 (09 Dec 2023 18:52) (91 - 91)  RR: 18 (10 Dec 2023 06:07) (17 - 20)  SpO2: 98% (10 Dec 2023 11:08) (97% - 99%)    Parameters below as of 10 Dec 2023 06:07  Patient On (Oxygen Delivery Method): room air        PHYSICAL EXAMINATION:  GENERAL: NAD,   HEAD:  Atraumatic, Normocephalic  EYES:  PERRLA,  conjunctiva and sclera clear  NECK: Supple, No JVD,   CHEST/LUNG: Clear to auscultation. Clear to percussion bilaterally; No rales, rhonchi, wheezing, or rubs  HEART: Regular rate and rhythm; No murmurs, rubs, or gallops  ABDOMEN: Soft, Nontender, Nondistended; Bowel sounds present, No Rovsing's sign   NERVOUS SYSTEM:  Alert & Oriented X3,    EXTREMITIES:  2+ Peripheral Pulses, No clubbing, cyanosis, or edema  CALF: No Calf tenderness   SKIN: warm dry                          11.3   3.65  )-----------( 268      ( 10 Dec 2023 06:33 )             35.5     12-10    136  |  107  |  13  ----------------------------<  92  4.1   |  23  |  0.58    Ca    8.1<L>      10 Dec 2023 06:33  Phos  2.7     12-10  Mg     2.3     12-10    TPro  6.2  /  Alb  3.2<L>  /  TBili  0.4  /  DBili  x   /  AST  25  /  ALT  36  /  AlkPhos  58  12-10    LIVER FUNCTIONS - ( 10 Dec 2023 06:33 )  Alb: 3.2 g/dL / Pro: 6.2 g/dL / ALK PHOS: 58 U/L / ALT: 36 U/L DA / AST: 25 U/L / GGT: x               PT/INR - ( 09 Dec 2023 10:45 )   PT: 10.3 sec;   INR: 0.90 ratio         PTT - ( 09 Dec 2023 10:45 )  PTT:28.1 sec    CAPILLARY BLOOD GLUCOSE      RADIOLOGY & ADDITIONAL TESTS:                   PGY-1 Progress Note discussed with attending    PAGER #: [[phone]] TILL 5:00 PM  PLEASE CONTACT ON CALL TEAM:  - On Call Team (Please refer to Dustin) FROM 5:00 PM - 8:30PM  - Nightfloat Team FROM 8:30 -7:30 AM    INTERVAL HPI/OVERNIGHT EVENTS:   No overnight events. Pt was examined in the morning at bedside with spouse and son present. Pt is AAOx3 and in NAD. Pt denies any symptoms     REVIEW OF SYSTEMS:  CONSTITUTIONAL: No fever, chills,  or fatigue  RESPIRATORY: No cough, wheezing, No shortness of breath  CARDIOVASCULAR: No chest pain, palpitations,  leg swelling  GASTROINTESTINAL: No abdominal pain. No nausea, vomiting, or hematemesis; No diarrhea or constipation. No bloody or black stool  GENITOURINARY: No dysuria or hematuria, urinary frequency  NEUROLOGICAL: No headaches, dizziness  SKIN: No itching, burning, rashes, or lesions     Vital Signs Last 24 Hrs  T(C): 36.4 (10 Dec 2023 06:07), Max: 36.5 (09 Dec 2023 18:52)  T(F): 97.5 (10 Dec 2023 06:07), Max: 97.7 (09 Dec 2023 18:52)  HR: 62 (10 Dec 2023 11:08) (51 - 65)  BP: 119/81 (10 Dec 2023 11:08) (119/81 - 145/75)  BP(mean): 91 (09 Dec 2023 18:52) (91 - 91)  RR: 18 (10 Dec 2023 06:07) (17 - 20)  SpO2: 98% (10 Dec 2023 11:08) (97% - 99%)    Parameters below as of 10 Dec 2023 06:07  Patient On (Oxygen Delivery Method): room air        PHYSICAL EXAMINATION:  GENERAL: NAD,   HEAD:  Atraumatic, Normocephalic  EYES:  PERRLA,  conjunctiva and sclera clear  NECK: Supple, No JVD,   CHEST/LUNG: Clear to auscultation. Clear to percussion bilaterally; No rales, rhonchi, wheezing, or rubs  HEART: Regular rate and rhythm; No murmurs, rubs, or gallops  ABDOMEN: Soft, Nontender, Nondistended; Bowel sounds present, No Rovsing's sign   NERVOUS SYSTEM:  Alert & Oriented X3, CN Vll - Vll intact, 5/5 strength in B/L UE & LE, normal gait, sensation intact  EXTREMITIES:  2+ Peripheral Pulses, No clubbing, cyanosis, or edema  CALF: No Calf tenderness   SKIN: warm dry                          11.3   3.65  )-----------( 268      ( 10 Dec 2023 06:33 )             35.5     12-10    136  |  107  |  13  ----------------------------<  92  4.1   |  23  |  0.58    Ca    8.1<L>      10 Dec 2023 06:33  Phos  2.7     12-10  Mg     2.3     12-10    TPro  6.2  /  Alb  3.2<L>  /  TBili  0.4  /  DBili  x   /  AST  25  /  ALT  36  /  AlkPhos  58  12-10    LIVER FUNCTIONS - ( 10 Dec 2023 06:33 )  Alb: 3.2 g/dL / Pro: 6.2 g/dL / ALK PHOS: 58 U/L / ALT: 36 U/L DA / AST: 25 U/L / GGT: x               PT/INR - ( 09 Dec 2023 10:45 )   PT: 10.3 sec;   INR: 0.90 ratio         PTT - ( 09 Dec 2023 10:45 )  PTT:28.1 sec    CAPILLARY BLOOD GLUCOSE      RADIOLOGY & ADDITIONAL TESTS:                   PGY-1 Progress Note discussed with attending    PAGER #: [[phone]] TILL 5:00 PM  PLEASE CONTACT ON CALL TEAM:  - On Call Team (Please refer to Dustin) FROM 5:00 PM - 8:30PM  - Nightfloat Team FROM 8:30 -7:30 AM    INTERVAL HPI/OVERNIGHT EVENTS:   No overnight events. Pt was examined in the morning at bedside with spouse and son present. Pt is AAOx3 and in NAD. Pt denies any symptoms     REVIEW OF SYSTEMS:  CONSTITUTIONAL: No fever, chills,  or fatigue  RESPIRATORY: No cough, wheezing, No shortness of breath  CARDIOVASCULAR: No chest pain, palpitations,  leg swelling  GASTROINTESTINAL: No abdominal pain. No nausea, vomiting, or hematemesis; No diarrhea or constipation. No bloody or black stool  GENITOURINARY: No dysuria or hematuria, urinary frequency  NEUROLOGICAL: No headaches, dizziness  SKIN: No itching, burning, rashes, or lesions     Vital Signs Last 24 Hrs  T(C): 36.4 (10 Dec 2023 06:07), Max: 36.5 (09 Dec 2023 18:52)  T(F): 97.5 (10 Dec 2023 06:07), Max: 97.7 (09 Dec 2023 18:52)  HR: 62 (10 Dec 2023 11:08) (51 - 65)  BP: 119/81 (10 Dec 2023 11:08) (119/81 - 145/75)  BP(mean): 91 (09 Dec 2023 18:52) (91 - 91)  RR: 18 (10 Dec 2023 06:07) (17 - 20)  SpO2: 98% (10 Dec 2023 11:08) (97% - 99%)    Parameters below as of 10 Dec 2023 06:07  Patient On (Oxygen Delivery Method): room air        PHYSICAL EXAMINATION:  GENERAL: NAD,   HEAD:  Atraumatic, Normocephalic  EYES:  PERRLA,  conjunctiva and sclera clear  NECK: Supple, No JVD,   CHEST/LUNG: Clear to auscultation. Clear to percussion bilaterally; No rales, rhonchi, wheezing, or rubs  HEART: Regular rate and rhythm; No murmurs, rubs, or gallops  ABDOMEN: Soft, Nontender, Nondistended; Bowel sounds present, No Rovsing's sign   NERVOUS SYSTEM:  Alert & Oriented X3, CN Vll - Vll intact, 5/5 strength in B/L UE & LE, normal gait, sensation intact  EXTREMITIES:  2+ Peripheral Pulses, No clubbing, cyanosis, or edema  CALF: No Calf tenderness   SKIN: warm dry                          11.3   3.65  )-----------( 268      ( 10 Dec 2023 06:33 )             35.5     12-10    136  |  107  |  13  ----------------------------<  92  4.1   |  23  |  0.58    Ca    8.1<L>      10 Dec 2023 06:33  Phos  2.7     12-10  Mg     2.3     12-10    TPro  6.2  /  Alb  3.2<L>  /  TBili  0.4  /  DBili  x   /  AST  25  /  ALT  36  /  AlkPhos  58  12-10    LIVER FUNCTIONS - ( 10 Dec 2023 06:33 )  Alb: 3.2 g/dL / Pro: 6.2 g/dL / ALK PHOS: 58 U/L / ALT: 36 U/L DA / AST: 25 U/L / GGT: x               PT/INR - ( 09 Dec 2023 10:45 )   PT: 10.3 sec;   INR: 0.90 ratio         PTT - ( 09 Dec 2023 10:45 )  PTT:28.1 sec    CAPILLARY BLOOD GLUCOSE      RADIOLOGY & ADDITIONAL TESTS:

## 2023-12-10 NOTE — PROGRESS NOTE ADULT - ASSESSMENT
74 y/o F, from home with PMHx of three previous TIAs (no residual deficits), HLD, and migraines who presents with difficulty speaking. Symptoms resolved in ED. Admitted for TIA workup. 72 y/o F, from home with PMHx of three previous TIAs (no residual deficits), HLD, and migraines who presents with difficulty speaking. Symptoms resolved in ED. Admitted for TIA workup.

## 2023-12-11 ENCOUNTER — TRANSCRIPTION ENCOUNTER (OUTPATIENT)
Age: 73
End: 2023-12-11

## 2023-12-11 LAB
ALBUMIN SERPL ELPH-MCNC: 3.3 G/DL — LOW (ref 3.5–5)
ALBUMIN SERPL ELPH-MCNC: 3.3 G/DL — LOW (ref 3.5–5)
ALP SERPL-CCNC: 67 U/L — SIGNIFICANT CHANGE UP (ref 40–120)
ALP SERPL-CCNC: 67 U/L — SIGNIFICANT CHANGE UP (ref 40–120)
ALT FLD-CCNC: 32 U/L DA — SIGNIFICANT CHANGE UP (ref 10–60)
ALT FLD-CCNC: 32 U/L DA — SIGNIFICANT CHANGE UP (ref 10–60)
ANION GAP SERPL CALC-SCNC: 5 MMOL/L — SIGNIFICANT CHANGE UP (ref 5–17)
ANION GAP SERPL CALC-SCNC: 5 MMOL/L — SIGNIFICANT CHANGE UP (ref 5–17)
AST SERPL-CCNC: 22 U/L — SIGNIFICANT CHANGE UP (ref 10–40)
AST SERPL-CCNC: 22 U/L — SIGNIFICANT CHANGE UP (ref 10–40)
BASOPHILS # BLD AUTO: 0.04 K/UL — SIGNIFICANT CHANGE UP (ref 0–0.2)
BASOPHILS # BLD AUTO: 0.04 K/UL — SIGNIFICANT CHANGE UP (ref 0–0.2)
BASOPHILS NFR BLD AUTO: 1.1 % — SIGNIFICANT CHANGE UP (ref 0–2)
BASOPHILS NFR BLD AUTO: 1.1 % — SIGNIFICANT CHANGE UP (ref 0–2)
BILIRUB SERPL-MCNC: 0.2 MG/DL — SIGNIFICANT CHANGE UP (ref 0.2–1.2)
BILIRUB SERPL-MCNC: 0.2 MG/DL — SIGNIFICANT CHANGE UP (ref 0.2–1.2)
BUN SERPL-MCNC: 15 MG/DL — SIGNIFICANT CHANGE UP (ref 7–18)
BUN SERPL-MCNC: 15 MG/DL — SIGNIFICANT CHANGE UP (ref 7–18)
CALCIUM SERPL-MCNC: 7.8 MG/DL — LOW (ref 8.4–10.5)
CALCIUM SERPL-MCNC: 7.8 MG/DL — LOW (ref 8.4–10.5)
CHLORIDE SERPL-SCNC: 105 MMOL/L — SIGNIFICANT CHANGE UP (ref 96–108)
CHLORIDE SERPL-SCNC: 105 MMOL/L — SIGNIFICANT CHANGE UP (ref 96–108)
CO2 SERPL-SCNC: 25 MMOL/L — SIGNIFICANT CHANGE UP (ref 22–31)
CO2 SERPL-SCNC: 25 MMOL/L — SIGNIFICANT CHANGE UP (ref 22–31)
CREAT SERPL-MCNC: 0.56 MG/DL — SIGNIFICANT CHANGE UP (ref 0.5–1.3)
CREAT SERPL-MCNC: 0.56 MG/DL — SIGNIFICANT CHANGE UP (ref 0.5–1.3)
EGFR: 95 ML/MIN/1.73M2 — SIGNIFICANT CHANGE UP
EGFR: 95 ML/MIN/1.73M2 — SIGNIFICANT CHANGE UP
EOSINOPHIL # BLD AUTO: 0.13 K/UL — SIGNIFICANT CHANGE UP (ref 0–0.5)
EOSINOPHIL # BLD AUTO: 0.13 K/UL — SIGNIFICANT CHANGE UP (ref 0–0.5)
EOSINOPHIL NFR BLD AUTO: 3.5 % — SIGNIFICANT CHANGE UP (ref 0–6)
EOSINOPHIL NFR BLD AUTO: 3.5 % — SIGNIFICANT CHANGE UP (ref 0–6)
GLUCOSE SERPL-MCNC: 91 MG/DL — SIGNIFICANT CHANGE UP (ref 70–99)
GLUCOSE SERPL-MCNC: 91 MG/DL — SIGNIFICANT CHANGE UP (ref 70–99)
HCT VFR BLD CALC: 35.5 % — SIGNIFICANT CHANGE UP (ref 34.5–45)
HCT VFR BLD CALC: 35.5 % — SIGNIFICANT CHANGE UP (ref 34.5–45)
HGB BLD-MCNC: 11 G/DL — LOW (ref 11.5–15.5)
HGB BLD-MCNC: 11 G/DL — LOW (ref 11.5–15.5)
IMM GRANULOCYTES NFR BLD AUTO: 0.3 % — SIGNIFICANT CHANGE UP (ref 0–0.9)
IMM GRANULOCYTES NFR BLD AUTO: 0.3 % — SIGNIFICANT CHANGE UP (ref 0–0.9)
LYMPHOCYTES # BLD AUTO: 1.32 K/UL — SIGNIFICANT CHANGE UP (ref 1–3.3)
LYMPHOCYTES # BLD AUTO: 1.32 K/UL — SIGNIFICANT CHANGE UP (ref 1–3.3)
LYMPHOCYTES # BLD AUTO: 35.2 % — SIGNIFICANT CHANGE UP (ref 13–44)
LYMPHOCYTES # BLD AUTO: 35.2 % — SIGNIFICANT CHANGE UP (ref 13–44)
MAGNESIUM SERPL-MCNC: 2.1 MG/DL — SIGNIFICANT CHANGE UP (ref 1.6–2.6)
MAGNESIUM SERPL-MCNC: 2.1 MG/DL — SIGNIFICANT CHANGE UP (ref 1.6–2.6)
MCHC RBC-ENTMCNC: 29.8 PG — SIGNIFICANT CHANGE UP (ref 27–34)
MCHC RBC-ENTMCNC: 29.8 PG — SIGNIFICANT CHANGE UP (ref 27–34)
MCHC RBC-ENTMCNC: 31 GM/DL — LOW (ref 32–36)
MCHC RBC-ENTMCNC: 31 GM/DL — LOW (ref 32–36)
MCV RBC AUTO: 96.2 FL — SIGNIFICANT CHANGE UP (ref 80–100)
MCV RBC AUTO: 96.2 FL — SIGNIFICANT CHANGE UP (ref 80–100)
MONOCYTES # BLD AUTO: 0.42 K/UL — SIGNIFICANT CHANGE UP (ref 0–0.9)
MONOCYTES # BLD AUTO: 0.42 K/UL — SIGNIFICANT CHANGE UP (ref 0–0.9)
MONOCYTES NFR BLD AUTO: 11.2 % — SIGNIFICANT CHANGE UP (ref 2–14)
MONOCYTES NFR BLD AUTO: 11.2 % — SIGNIFICANT CHANGE UP (ref 2–14)
NEUTROPHILS # BLD AUTO: 1.83 K/UL — SIGNIFICANT CHANGE UP (ref 1.8–7.4)
NEUTROPHILS # BLD AUTO: 1.83 K/UL — SIGNIFICANT CHANGE UP (ref 1.8–7.4)
NEUTROPHILS NFR BLD AUTO: 48.7 % — SIGNIFICANT CHANGE UP (ref 43–77)
NEUTROPHILS NFR BLD AUTO: 48.7 % — SIGNIFICANT CHANGE UP (ref 43–77)
NRBC # BLD: 0 /100 WBCS — SIGNIFICANT CHANGE UP (ref 0–0)
NRBC # BLD: 0 /100 WBCS — SIGNIFICANT CHANGE UP (ref 0–0)
PHOSPHATE SERPL-MCNC: 3.4 MG/DL — SIGNIFICANT CHANGE UP (ref 2.5–4.5)
PHOSPHATE SERPL-MCNC: 3.4 MG/DL — SIGNIFICANT CHANGE UP (ref 2.5–4.5)
PLATELET # BLD AUTO: 270 K/UL — SIGNIFICANT CHANGE UP (ref 150–400)
PLATELET # BLD AUTO: 270 K/UL — SIGNIFICANT CHANGE UP (ref 150–400)
POTASSIUM SERPL-MCNC: 3.7 MMOL/L — SIGNIFICANT CHANGE UP (ref 3.5–5.3)
POTASSIUM SERPL-MCNC: 3.7 MMOL/L — SIGNIFICANT CHANGE UP (ref 3.5–5.3)
POTASSIUM SERPL-SCNC: 3.7 MMOL/L — SIGNIFICANT CHANGE UP (ref 3.5–5.3)
POTASSIUM SERPL-SCNC: 3.7 MMOL/L — SIGNIFICANT CHANGE UP (ref 3.5–5.3)
PROT SERPL-MCNC: 6.2 G/DL — SIGNIFICANT CHANGE UP (ref 6–8.3)
PROT SERPL-MCNC: 6.2 G/DL — SIGNIFICANT CHANGE UP (ref 6–8.3)
RBC # BLD: 3.69 M/UL — LOW (ref 3.8–5.2)
RBC # BLD: 3.69 M/UL — LOW (ref 3.8–5.2)
RBC # FLD: 13.9 % — SIGNIFICANT CHANGE UP (ref 10.3–14.5)
RBC # FLD: 13.9 % — SIGNIFICANT CHANGE UP (ref 10.3–14.5)
SODIUM SERPL-SCNC: 135 MMOL/L — SIGNIFICANT CHANGE UP (ref 135–145)
SODIUM SERPL-SCNC: 135 MMOL/L — SIGNIFICANT CHANGE UP (ref 135–145)
WBC # BLD: 3.75 K/UL — LOW (ref 3.8–10.5)
WBC # BLD: 3.75 K/UL — LOW (ref 3.8–10.5)
WBC # FLD AUTO: 3.75 K/UL — LOW (ref 3.8–10.5)
WBC # FLD AUTO: 3.75 K/UL — LOW (ref 3.8–10.5)

## 2023-12-11 PROCEDURE — 70551 MRI BRAIN STEM W/O DYE: CPT | Mod: 26

## 2023-12-11 PROCEDURE — 93306 TTE W/DOPPLER COMPLETE: CPT | Mod: 26

## 2023-12-11 RX ORDER — SUMATRIPTAN SUCCINATE 4 MG/.5ML
25 INJECTION, SOLUTION SUBCUTANEOUS ONCE
Refills: 0 | Status: COMPLETED | OUTPATIENT
Start: 2023-12-11 | End: 2023-12-11

## 2023-12-11 RX ORDER — OXYCODONE AND ACETAMINOPHEN 5; 325 MG/1; MG/1
1 TABLET ORAL ONCE
Refills: 0 | Status: DISCONTINUED | OUTPATIENT
Start: 2023-12-11 | End: 2023-12-11

## 2023-12-11 RX ORDER — ATORVASTATIN CALCIUM 80 MG/1
40 TABLET, FILM COATED ORAL AT BEDTIME
Refills: 0 | Status: DISCONTINUED | OUTPATIENT
Start: 2023-12-11 | End: 2023-12-12

## 2023-12-11 RX ORDER — CLOPIDOGREL BISULFATE 75 MG/1
75 TABLET, FILM COATED ORAL DAILY
Refills: 0 | Status: DISCONTINUED | OUTPATIENT
Start: 2023-12-11 | End: 2023-12-11

## 2023-12-11 RX ORDER — ACETAMINOPHEN 500 MG
650 TABLET ORAL EVERY 6 HOURS
Refills: 0 | Status: DISCONTINUED | OUTPATIENT
Start: 2023-12-11 | End: 2023-12-12

## 2023-12-11 RX ADMIN — Medication 81 MILLIGRAM(S): at 13:43

## 2023-12-11 RX ADMIN — Medication 650 MILLIGRAM(S): at 14:45

## 2023-12-11 RX ADMIN — Medication 650 MILLIGRAM(S): at 13:43

## 2023-12-11 RX ADMIN — ATORVASTATIN CALCIUM 40 MILLIGRAM(S): 80 TABLET, FILM COATED ORAL at 21:19

## 2023-12-11 RX ADMIN — Medication 650 MILLIGRAM(S): at 06:16

## 2023-12-11 RX ADMIN — Medication 650 MILLIGRAM(S): at 05:30

## 2023-12-11 NOTE — CONSULT NOTE ADULT - PROBLEM SELECTOR RECOMMENDATION 9
-patient without residual symptoms  -ILR Interrogation revealed    Device: [employer] Reveal Linq LNQ  Implanting:    Indication: Cryptogenic CVA  Date of Implant:    Parameter Changes: N/A  Last remote monitoring session ([employer] [facility]):  N/A  Events/Observation:  No events detected on date of admission; no true pauses or tachyarrhythmias on event log    Impression/Plan:  Normal device function.  No events to correlate with presenting symptoms. Discussed findings with covering staff and EP attending. -patient without residual symptoms  -ILR Interrogation revealed    Device: [employer] Reveal Linq LNQ  Implanting:    Indication: Cryptogenic CVA  Date of Implant:    Parameter Changes: N/A  Last remote monitoring session ([employer] [facility]):  N/A  Events/Observation:  No events detected on date of admission; no true pauses or tachyarrhythmias on event log    Impression/Plan:  Normal device function.  No events to correlate with presenting symptoms. Discussed findings with covering staff and EP attending. -patient without residual symptoms  -A tach on tele is very short run  -ILR Interrogation revealed    Device: [employer] Reveal Linq II  SN:  [id number]  Indication: Cryptogenic CVA  Date of Implant:  1/31/2022  Parameter Changes: N/A  Last remote monitoring session ([employer]):  N/A  Events/Observation:  No events detected, no true pauses or tachyarrhythmias on event log  Impression/Plan:  Normal device function.    -No EP interventions indicated at present time -patient without residual symptoms  -A tach on tele is very short run  -ILR Interrogation revealed    Device: [employer] Reveal Linq II  SN:  [id number]  Indication: Cryptogenic CVA  Date of Implant:  1/31/2022  Parameter Changes: N/A  Last remote monitoring session ([facility]):  N/A  Events/Observation:  No events detected, no true pauses or tachyarrhythmias on event log  Impression/Plan:  Normal device function.    -No EP interventions indicated at present time

## 2023-12-11 NOTE — DISCHARGE NOTE PROVIDER - NSDCCAREPROVSEEN_GEN_ALL_CORE_FT
Abdiel, Stephon Walker, Patience Saul Abdiel, Stephon Walker, Laxmi Jensen, Patience Jimenez, Yelena

## 2023-12-11 NOTE — CONSULT NOTE ADULT - SUBJECTIVE AND OBJECTIVE BOX
CHIEF COMPLAINT:    HPI:    PAST MEDICAL & SURGICAL HISTORY:  Cerebrovascular accident (CVA)      H/O hyperlipidemia      No significant past surgical history          Allergies    ibuprofen (Other)    Intolerances        MEDICATIONS  (STANDING):  aspirin  chewable 81 milliGRAM(s) Oral daily  atorvastatin 40 milliGRAM(s) Oral at bedtime    MEDICATIONS  (PRN):  acetaminophen     Tablet .. 650 milliGRAM(s) Oral every 6 hours PRN Temp greater or equal to 38C (100.4F), Mild Pain (1 - 3)  aluminum hydroxide/magnesium hydroxide/simethicone Suspension 30 milliLiter(s) Oral every 4 hours PRN Dyspepsia      FAMILY HISTORY:      ***No family history of premature coronary artery disease or sudden cardiac death    SOCIAL HISTORY:  Smoking-  Alcohol-  Illicit Drug use-    REVIEW OF SYSTEMS:  Constitutional: [ ] fever, [ ]weight loss,  [ ]fatigue  Eyes: [ ] visual changes  Respiratory: [ ]shortness of breath;  [ ] cough, [ ]wheezing, [ ]chills, [ ]hemoptysis  Cardiovascular: [ ] chest pain, [ ]palpitations, [ ]dizziness,  [ ]leg swelling [ ]syncope  Gastrointestinal: [ ] abdominal pain, [ ]nausea, [ ]vomiting,  [ ]diarrhea   Genitourinary: [ ] dysuria, [ ] hematuria  Neurologic: [ ] headaches [ ] tremors  [ ] weakness [ ] lightheadedness  Skin: [ ] itching, [ ]burning, [ ] rashes  Endocrine: [ ] heat or cold intolerance  Musculoskeletal: [ ] joint pain or swelling; [ ] muscle, back, or extremity pain  Psychiatric: [ ] depression, [ ]anxiety, [ ]mood swings, or [ ]difficulty sleeping  Hematologic: [ ] easy bruising, [ ] bleeding gums     [ x] All others negative	  [ ] Unable to obtain    Vital Signs Last 24 Hrs  T(C): 36.6 (11 Dec 2023 16:10), Max: 36.9 (10 Dec 2023 18:12)  T(F): 97.9 (11 Dec 2023 16:10), Max: 98.4 (10 Dec 2023 18:12)  HR: 60 (11 Dec 2023 16:44) (59 - 69)  BP: 156/81 (11 Dec 2023 16:44) (136/77 - 161/94)  BP(mean): --  RR: 18 (11 Dec 2023 16:10) (16 - 18)  SpO2: 98% (11 Dec 2023 16:10) (96% - 100%)    Parameters below as of 11 Dec 2023 16:10  Patient On (Oxygen Delivery Method): room air      I&O's Summary      PHYSICAL EXAM:  General: No acute distress  HEENT: EOMI  Neck:  No JVD  Lungs: Clear to auscultation bilaterally; No rales or wheezing  Heart: Regular rate and rhythm; No murmurs, rubs, or gallops  Abdomen: soft, non tender, non distended   Extremities: warm, no edema   Nervous system:  Alert & Oriented X3  Psychiatric: Normal affect  Skin: No rashes or lesions    LABS:  12-11    135  |  105  |  15  ----------------------------<  91  3.7   |  25  |  0.56    Ca    7.8<L>      11 Dec 2023 05:57  Phos  3.4     12-11  Mg     2.1     12-11    TPro  6.2  /  Alb  3.3<L>  /  TBili  0.2  /  DBili  x   /  AST  22  /  ALT  32  /  AlkPhos  67  12-11    Creatinine Trend: 0.56<--, 0.58<--, 0.55<--                        11.0   3.75  )-----------( 270      ( 11 Dec 2023 05:57 )             35.5         Lipid Panel:   Cardiac Enzymes:           RADIOLOGY:    ECG [my interpretation]:    TELEMETRY:    ECHO:    STRESS TEST:    CATHETERIZATION:               CHIEF COMPLAINT:      HPI:  This is a 72 y/o F, from home with PMHx of three previous TIAs (no residual deficits), HLD, ILR, and migraines who presents with difficulty speaking. Pt states this morning she had difficulty saying any words which prompted her  to bring her to the hospital. Speech improved once she arrived to the hospital. Patient denies chest pain, palpitations, lightheadedness, syncope, shortness of breath, LE edema, PND/orthopnea.      PAST MEDICAL & SURGICAL HISTORY:  Cerebrovascular accident (CVA)      H/O hyperlipidemia      No significant past surgical history          Allergies    ibuprofen (Other)    Intolerances        MEDICATIONS  (STANDING):  aspirin  chewable 81 milliGRAM(s) Oral daily  atorvastatin 40 milliGRAM(s) Oral at bedtime    MEDICATIONS  (PRN):  acetaminophen     Tablet .. 650 milliGRAM(s) Oral every 6 hours PRN Temp greater or equal to 38C (100.4F), Mild Pain (1 - 3)  aluminum hydroxide/magnesium hydroxide/simethicone Suspension 30 milliLiter(s) Oral every 4 hours PRN Dyspepsia      FAMILY HISTORY:      ***No family history of premature coronary artery disease or sudden cardiac death    SOCIAL HISTORY:  Smoking-  Alcohol-  Illicit Drug use-    REVIEW OF SYSTEMS:  Constitutional: [ ] fever, [ ]weight loss,  [ ]fatigue  Eyes: [ ] visual changes  Respiratory: [ ]shortness of breath;  [ ] cough, [ ]wheezing, [ ]chills, [ ]hemoptysis  Cardiovascular: [ ] chest pain, [ ]palpitations, [ ]dizziness,  [ ]leg swelling [ ]syncope  Gastrointestinal: [ ] abdominal pain, [ ]nausea, [ ]vomiting,  [ ]diarrhea   Genitourinary: [ ] dysuria, [ ] hematuria  Neurologic: [ ] headaches [ ] tremors  [ ] weakness [ ] lightheadedness [ X ] slurred speech  Skin: [ ] itching, [ ]burning, [ ] rashes  Endocrine: [ ] heat or cold intolerance  Musculoskeletal: [ ] joint pain or swelling; [ ] muscle, back, or extremity pain  Psychiatric: [ ] depression, [ ]anxiety, [ ]mood swings, or [ ]difficulty sleeping  Hematologic: [ ] easy bruising, [ ] bleeding gums     [ x] All others negative	  [ ] Unable to obtain    Vital Signs Last 24 Hrs  T(C): 36.6 (11 Dec 2023 16:10), Max: 36.9 (10 Dec 2023 18:12)  T(F): 97.9 (11 Dec 2023 16:10), Max: 98.4 (10 Dec 2023 18:12)  HR: 60 (11 Dec 2023 16:44) (59 - 69)  BP: 156/81 (11 Dec 2023 16:44) (136/77 - 161/94)  BP(mean): --  RR: 18 (11 Dec 2023 16:10) (16 - 18)  SpO2: 98% (11 Dec 2023 16:10) (96% - 100%)    Parameters below as of 11 Dec 2023 16:10  Patient On (Oxygen Delivery Method): room air      I&O's Summary      PHYSICAL EXAM:  General: No acute distress  HEENT: EOMI  Neck:  No JVD  Lungs: Clear to auscultation bilaterally; No rales or wheezing  Heart: Regular rate and rhythm; No murmurs, rubs, or gallops  Abdomen: soft, non tender, non distended   Extremities: warm, no edema   Nervous system:  Alert & Oriented X3  Psychiatric: Normal affect  Skin: No rashes or lesions    LABS:  12-11    135  |  105  |  15  ----------------------------<  91  3.7   |  25  |  0.56    Ca    7.8<L>      11 Dec 2023 05:57  Phos  3.4     12-11  Mg     2.1     12-11    TPro  6.2  /  Alb  3.3<L>  /  TBili  0.2  /  DBili  x   /  AST  22  /  ALT  32  /  AlkPhos  67  12-11    Creatinine Trend: 0.56<--, 0.58<--, 0.55<--                        11.0   3.75  )-----------( 270      ( 11 Dec 2023 05:57 )             35.5         Lipid Panel:   Cardiac Enzymes:       RADIOLOGY:    < from: MR Head No Cont (12.11.23 @ 12:28) >  Restricted diffusion in the LEFT basal ganglia and   scattered in the LEFT frontal and parietal cortex, consistent with   multiple acute infarctions in the region of the LEFT MCA territory. No   acute hemorrhage is seen. However there are innumerable foci of   hemosiderin scattered in the cortex of the leptomeningeal region   throughout the brain suggestive of amyloid angiopathy. Mild pontine and   moderate periventricular, deep and subcortical white matter ischemia is   noted with old cortical infarction in the RIGHT occipital lobe. Tiny old   lacunar infarctions are seen in the BILATERAL basal ganglia, corona   radiata and cerebellum.    --- End of Report ---      < end of copied text >  < from: CT Brain Perfusion Maps Stroke (12.09.23 @ 10:48) >  1. Partial obscuration of the left-sided neck vasculature secondary to  artifact related to adjacent dense venous contrast, precluding accurate   evaluation. Otherwise, the vertebral arteries appear patent and   codominant. Segmental luminal nodularity-irregularity RIGHT proximal V3   segment may reflect atypical atherosclerosis versus vasculitis.  2. As visualized, no definitive evidence of significant stenosis,   occlusion or dissection bilateral extracranial carotid arteries.  3. No evidence of large vessel occlusion, definitive aneurysm or vascular   malformation intracranial circulation. Severe segmental stenosis P3   segment RIGHT posterior cerebral artery as well as abrupt truncation of a   probable M4 cortical branch LEFT middle cerebral artery, as above.  4. Additional findings described in detail above, including evidence of a   dehiscent right jugular bulb; the significance of which should be   determined on a clinical basis.    In light of the above findings, recommend noncontrast MRI of the brain   for further evaluation, provided there are no medical contraindications.   Results of CT brain and recommendations discussed with Dr. Small at   approximately 11:00 AM the day of this exam.    --- End of Report ---              < end of copied text >  < from: CT Brain Stroke Protocol (12.09.23 @ 10:35) >  CT BRAIN  1. No evidence of acute hemorrhage.  2. Evidence of moderate chronic microvascular ischemic change with newly   visualized focus of moderate hypoattenuation-cytotoxic edema right   parieto-occipital region; a finding suspicious for acute-subacute   ischemia in the appropriate clinical setting.  3. Additional findings described in detail above.    CT PERFUSION  1. No predicted core infarct.  2. Evidence of active ischemia-tissue at risk right parieto-occipital   region (4 mL).    < end of copied text >      ECG [my interpretation]:     TELEMETRY:  NSR  HR Range 55-80 bpm  Short episode of AT non sustained    ECHO:  < from: Transthoracic Echocardiogram (12.11.23 @ 06:56) >  1. Normal mitral valve.Mitral annular calcification. Trace  mitral regurgitation.  2. Trileaflet aortic valve. No aortic stenosis. Trace  aortic regurgitation.  3. Asymmetric thickening of the basal anteroseptum; max  thickness 1.3cm.  4. Hyperdynamic left ventricular systolic function (EF  >70%).  5. Right atrium appears borderline dilated.  6. Normal right ventricular size and systolic function  (TAPSE 2.9 cm).  7. Normal tricuspid valve. Mild tricuspid regurgitation.  8. Agitated saline injection revealed bubbles inthe left  heart, consistent with patent foramen ovale or atrial  septal defect.    *** No previous Echo exam.  ------------------------------------------------------------------------    < end of copied text >                   CHIEF COMPLAINT:      HPI:  This is a 74 y/o F, from home with PMHx of three previous TIAs (no residual deficits), HLD, ILR, and migraines who presents with difficulty speaking. Pt states this morning she had difficulty saying any words which prompted her  to bring her to the hospital. Speech improved once she arrived to the hospital. Patient denies chest pain, palpitations, lightheadedness, syncope, shortness of breath, LE edema, PND/orthopnea.      PAST MEDICAL & SURGICAL HISTORY:  Cerebrovascular accident (CVA)      H/O hyperlipidemia      No significant past surgical history          Allergies    ibuprofen (Other)    Intolerances        MEDICATIONS  (STANDING):  aspirin  chewable 81 milliGRAM(s) Oral daily  atorvastatin 40 milliGRAM(s) Oral at bedtime    MEDICATIONS  (PRN):  acetaminophen     Tablet .. 650 milliGRAM(s) Oral every 6 hours PRN Temp greater or equal to 38C (100.4F), Mild Pain (1 - 3)  aluminum hydroxide/magnesium hydroxide/simethicone Suspension 30 milliLiter(s) Oral every 4 hours PRN Dyspepsia      FAMILY HISTORY:      ***No family history of premature coronary artery disease or sudden cardiac death    SOCIAL HISTORY:  Smoking-  Alcohol-  Illicit Drug use-    REVIEW OF SYSTEMS:  Constitutional: [ ] fever, [ ]weight loss,  [ ]fatigue  Eyes: [ ] visual changes  Respiratory: [ ]shortness of breath;  [ ] cough, [ ]wheezing, [ ]chills, [ ]hemoptysis  Cardiovascular: [ ] chest pain, [ ]palpitations, [ ]dizziness,  [ ]leg swelling [ ]syncope  Gastrointestinal: [ ] abdominal pain, [ ]nausea, [ ]vomiting,  [ ]diarrhea   Genitourinary: [ ] dysuria, [ ] hematuria  Neurologic: [ ] headaches [ ] tremors  [ ] weakness [ ] lightheadedness [ X ] slurred speech  Skin: [ ] itching, [ ]burning, [ ] rashes  Endocrine: [ ] heat or cold intolerance  Musculoskeletal: [ ] joint pain or swelling; [ ] muscle, back, or extremity pain  Psychiatric: [ ] depression, [ ]anxiety, [ ]mood swings, or [ ]difficulty sleeping  Hematologic: [ ] easy bruising, [ ] bleeding gums     [ x] All others negative	  [ ] Unable to obtain    Vital Signs Last 24 Hrs  T(C): 36.6 (11 Dec 2023 16:10), Max: 36.9 (10 Dec 2023 18:12)  T(F): 97.9 (11 Dec 2023 16:10), Max: 98.4 (10 Dec 2023 18:12)  HR: 60 (11 Dec 2023 16:44) (59 - 69)  BP: 156/81 (11 Dec 2023 16:44) (136/77 - 161/94)  BP(mean): --  RR: 18 (11 Dec 2023 16:10) (16 - 18)  SpO2: 98% (11 Dec 2023 16:10) (96% - 100%)    Parameters below as of 11 Dec 2023 16:10  Patient On (Oxygen Delivery Method): room air      I&O's Summary      PHYSICAL EXAM:  General: No acute distress  HEENT: EOMI  Neck:  No JVD  Lungs: Clear to auscultation bilaterally; No rales or wheezing  Heart: Regular rate and rhythm; No murmurs, rubs, or gallops  Abdomen: soft, non tender, non distended   Extremities: warm, no edema   Nervous system:  Alert & Oriented X3  Psychiatric: Normal affect  Skin: No rashes or lesions    LABS:  12-11    135  |  105  |  15  ----------------------------<  91  3.7   |  25  |  0.56    Ca    7.8<L>      11 Dec 2023 05:57  Phos  3.4     12-11  Mg     2.1     12-11    TPro  6.2  /  Alb  3.3<L>  /  TBili  0.2  /  DBili  x   /  AST  22  /  ALT  32  /  AlkPhos  67  12-11    Creatinine Trend: 0.56<--, 0.58<--, 0.55<--                        11.0   3.75  )-----------( 270      ( 11 Dec 2023 05:57 )             35.5         Lipid Panel:   Cardiac Enzymes:       RADIOLOGY:    < from: MR Head No Cont (12.11.23 @ 12:28) >  Restricted diffusion in the LEFT basal ganglia and   scattered in the LEFT frontal and parietal cortex, consistent with   multiple acute infarctions in the region of the LEFT MCA territory. No   acute hemorrhage is seen. However there are innumerable foci of   hemosiderin scattered in the cortex of the leptomeningeal region   throughout the brain suggestive of amyloid angiopathy. Mild pontine and   moderate periventricular, deep and subcortical white matter ischemia is   noted with old cortical infarction in the RIGHT occipital lobe. Tiny old   lacunar infarctions are seen in the BILATERAL basal ganglia, corona   radiata and cerebellum.    --- End of Report ---      < end of copied text >  < from: CT Brain Perfusion Maps Stroke (12.09.23 @ 10:48) >  1. Partial obscuration of the left-sided neck vasculature secondary to  artifact related to adjacent dense venous contrast, precluding accurate   evaluation. Otherwise, the vertebral arteries appear patent and   codominant. Segmental luminal nodularity-irregularity RIGHT proximal V3   segment may reflect atypical atherosclerosis versus vasculitis.  2. As visualized, no definitive evidence of significant stenosis,   occlusion or dissection bilateral extracranial carotid arteries.  3. No evidence of large vessel occlusion, definitive aneurysm or vascular   malformation intracranial circulation. Severe segmental stenosis P3   segment RIGHT posterior cerebral artery as well as abrupt truncation of a   probable M4 cortical branch LEFT middle cerebral artery, as above.  4. Additional findings described in detail above, including evidence of a   dehiscent right jugular bulb; the significance of which should be   determined on a clinical basis.    In light of the above findings, recommend noncontrast MRI of the brain   for further evaluation, provided there are no medical contraindications.   Results of CT brain and recommendations discussed with Dr. Small at   approximately 11:00 AM the day of this exam.    --- End of Report ---              < end of copied text >  < from: CT Brain Stroke Protocol (12.09.23 @ 10:35) >  CT BRAIN  1. No evidence of acute hemorrhage.  2. Evidence of moderate chronic microvascular ischemic change with newly   visualized focus of moderate hypoattenuation-cytotoxic edema right   parieto-occipital region; a finding suspicious for acute-subacute   ischemia in the appropriate clinical setting.  3. Additional findings described in detail above.    CT PERFUSION  1. No predicted core infarct.  2. Evidence of active ischemia-tissue at risk right parieto-occipital   region (4 mL).    < end of copied text >      ECG [my interpretation]:     TELEMETRY:  NSR  HR Range 55-80 bpm  Short episode of AT non sustained    ECHO:  < from: Transthoracic Echocardiogram (12.11.23 @ 06:56) >  1. Normal mitral valve.Mitral annular calcification. Trace  mitral regurgitation.  2. Trileaflet aortic valve. No aortic stenosis. Trace  aortic regurgitation.  3. Asymmetric thickening of the basal anteroseptum; max  thickness 1.3cm.  4. Hyperdynamic left ventricular systolic function (EF  >70%).  5. Right atrium appears borderline dilated.  6. Normal right ventricular size and systolic function  (TAPSE 2.9 cm).  7. Normal tricuspid valve. Mild tricuspid regurgitation.  8. Agitated saline injection revealed bubbles inthe left  heart, consistent with patent foramen ovale or atrial  septal defect.    *** No previous Echo exam.  ------------------------------------------------------------------------    < end of copied text >

## 2023-12-11 NOTE — CONSULT NOTE ADULT - NS ATTEND AMEND GEN_ALL_CORE FT
No evidence of AF on ILR. short pAT seen on tele. No clear indication for AC at this time. EP to sign off.

## 2023-12-11 NOTE — DISCHARGE NOTE PROVIDER - HOSPITAL COURSE
This is a 74 y/o F, from home with PMHx of three previous TIAs (no residual deficits), HLD, and migraines who presents with difficulty speaking. Pt states this morning she had difficulty saying any words which prompted her  to bring her to the hospital. Speech improved once she arrived to the hospital. NIHSS 1.  CTH: Evidence of moderate chronic microvascular ischemic change with newly visualized focus of moderate hypoattenuation-cytotoxic edema right parieto-occipital region; a finding suspicious for acute-subacute ischemia in the appropriate clinical setting. Neurology Dr. Jimenez consulted. Patient was started on Aspirin, Atorvastatin and Plavix. Admitted to medicine for TIA workup     Since admission, pt has been hemodynamically stable, afebrile and saturating well on room air. Neurologically patient is intact and no deficit. Echo showed xxxxxxx  MRI showed xxxxxxxx    For her history of HLD, Pt takes Simvastatin 10mg daily at home. Pt was started on Atorvastatin 80mg daily while in the hospital. For her history of migraines, pt takes  butalbital at home as needed. Since admission, pt has not complained of headaches.     Given patient's improved clinical status and current hemodynamic stability, decision was made to discharge after discussing with attending physician. Please refer to patient's complete medical chart with documents for a full hospital course, for this is only a brief summary.       This is a 72 y/o F, from home with PMHx of three previous TIAs (no residual deficits), HLD, and migraines who presents with difficulty speaking. Pt states this morning she had difficulty saying any words which prompted her  to bring her to the hospital. Speech improved once she arrived to the hospital. NIHSS 1.  CTH: Evidence of moderate chronic microvascular ischemic change with newly visualized focus of moderate hypoattenuation-cytotoxic edema right parieto-occipital region; a finding suspicious for acute-subacute ischemia in the appropriate clinical setting. Neurology Dr. Jimenez consulted. Patient was started on Aspirin and Atorvastatin . Admitted to medicine for TIA workup     Since admission, pt has been hemodynamically stable, afebrile and saturating well on room air. Neurologically patient is intact and no deficit. Echo showed xxxxxxx  MRI showed xxxxxxxx    For her history of HLD, Pt takes Simvastatin 10mg daily at home. Pt was started on Atorvastatin 80mg daily while in the hospital. For her history of migraines, pt takes  butalbital at home as needed. Since admission, pt has not complained of headaches.     Given patient's improved clinical status and current hemodynamic stability, decision was made to discharge after discussing with attending physician. Please refer to patient's complete medical chart with documents for a full hospital course, for this is only a brief summary.       This is a 74 y/o F, from home with PMHx of three previous TIAs (no residual deficits), HLD, and migraines who presents with difficulty speaking. Pt states this morning she had difficulty saying any words which prompted her  to bring her to the hospital. Speech improved once she arrived to the hospital. NIHSS 1.  CTH: Evidence of moderate chronic microvascular ischemic change with newly visualized focus of moderate hypoattenuation-cytotoxic edema right parieto-occipital region; a finding suspicious for acute-subacute ischemia in the appropriate clinical setting. Neurology Dr. Jimenez consulted. Patient was started on Aspirin and Atorvastatin . Admitted to medicine for TIA workup     Since admission, pt has been hemodynamically stable, afebrile and saturating well on room air. Neurologically patient is intact and no deficit. Echo showed  EF >70%, PFO   MRI showed Restricted diffusion in the LEFT basal ganglia and scattered in the LEFT frontal and parietal cortex, consistent with multiple acute infarctions in the region of the LEFT MCA territory. No acute hemorrhage. amyloid angiopathy. White matter ischemia is noted with old cortical infarction in the RIGHT occipital lobe. Tiny old lacunar infarctions are seen in the BILATERAL basal ganglia, corona radiata and cerebellum. Since MRI showed multiple infarcts pt was started on clopidogrel     For her history of HLD, Pt takes Simvastatin 10mg daily at home. Pt was started on Atorvastatin 80mg daily while in the hospital. For her history of migraines, pt takes  butalbital at home as needed. Since admission, pt has not complained of headaches.     Given patient's improved clinical status and current hemodynamic stability, decision was made to discharge after discussing with attending physician. Please refer to patient's complete medical chart with documents for a full hospital course, for this is only a brief summary.

## 2023-12-11 NOTE — PROGRESS NOTE ADULT - PROBLEM SELECTOR PLAN 2
Pt has a history of HLD, takes Simvastatin 10mg daily at home  - Increased to high intensity statin  - C/w Atorvastatin 80mg daily Pt has a history of HLD, takes Simvastatin 10mg daily at home  Increased to high intensity statin  Lipid panel wnl  Decreasing Atorvastatin from 80 to 40mg  - C/w Atorvastatin 40mg daily

## 2023-12-11 NOTE — PROGRESS NOTE ADULT - TIME BILLING
- Review of records, telemetry, vital signs and daily labs.   - General and cardiovascular physical examination.  - Generation of cardiovascular treatment plan.  - Coordination of care.      Patient was seen and examined by me on, 12/11/23nterim events noted,labs and radiology studies reviewed.  Karthikeyan Subramanian MD,FACC.  [address] [place], [place] [id number].  [phone] - Review of records, telemetry, vital signs and daily labs.   - General and cardiovascular physical examination.  - Generation of cardiovascular treatment plan.  - Coordination of care.      Patient was seen and examined by me on, 12/11/23nterim events noted,labs and radiology studies reviewed.  Karthikeyan Subramanian MD,FACC.  [address] [place], [place] [id number].  [phone]

## 2023-12-11 NOTE — DISCHARGE NOTE PROVIDER - NSDCCPCAREPLAN_GEN_ALL_CORE_FT
PRINCIPAL DISCHARGE DIAGNOSIS  Diagnosis: Transient ischemic attack  Assessment and Plan of Treatment: You were diagnosed with transient ischemic attack (TIA) which means your brain had a temporary loss of oxygen perfusion but resolved on its own without any permanent brain damage. TIAs may present with transient neurologic symptoms such as facial droop, extremity weakness and difficulty speaking.  TIAs are often precursors to strokes and could serve as warning to prevent future stroke occurence.    CT head:   MRI brain were unremarkable for any acute events. You were seen by Neurology who recommends   ASPIRIN 81 MG DAILY, ATORVASTATIN 80 MG DAILY AND PLAVIX 75 MG   . Please take medications as prescribed and follow up with your PCP/ Neurologist in a week from discharge.        SECONDARY DISCHARGE DIAGNOSES  Diagnosis: HLD (hyperlipidemia)  Assessment and Plan of Treatment: You have history of Hyperlipidemia. You take Simvastatin 10mg daily at home. Your home medication were held. You were started on Atorvastatin 80mg Daily. Lipid panel were within normal range.   Please take your medication as prescribed. Maintain healthy lifestyle, low fat diet, exercise regularly and check your lipid levels routinely.   Please follow up with your PCP in 1 week from discharge.      Diagnosis: Migraine  Assessment and Plan of Treatment: You have a history of migraine. Please continue to use your home medication as needed for your headache     PRINCIPAL DISCHARGE DIAGNOSIS  Diagnosis: Transient ischemic attack  Assessment and Plan of Treatment: You were diagnosed with transient ischemic attack (TIA) which means your brain had a temporary loss of oxygen perfusion but resolved on its own without any permanent brain damage. TIAs may present with transient neurologic symptoms such as facial droop, extremity weakness and difficulty speaking.  TIAs are often precursors to strokes and could serve as warning to prevent future stroke occurence.    CT head:   MRI brain were unremarkable for any acute events. You were seen by Neurology who recommends   ASPIRIN 81 MG DAILY AND ATORVASTATIN 80 MG DAILY  . Please take medications as prescribed and follow up with your PCP/ Neurologist in a week from discharge.        SECONDARY DISCHARGE DIAGNOSES  Diagnosis: HLD (hyperlipidemia)  Assessment and Plan of Treatment: You have history of Hyperlipidemia. You take Simvastatin 10mg daily at home. Your home medication were held. You were started on Atorvastatin 80mg Daily. Lipid panel were within normal range.   Please take your medication as prescribed. Maintain healthy lifestyle, low fat diet, exercise regularly and check your lipid levels routinely.   Please follow up with your PCP in 1 week from discharge.      Diagnosis: Migraine  Assessment and Plan of Treatment: You have a history of migraine. Please continue to use your home medication as needed for your headache     PRINCIPAL DISCHARGE DIAGNOSIS  Diagnosis: Transient ischemic attack  Assessment and Plan of Treatment: You were diagnosed with transient ischemic attack (TIA) which means your brain had a temporary loss of oxygen perfusion but resolved on its own without any permanent brain damage. TIAs may present with transient neurologic symptoms such as facial droop, extremity weakness and difficulty speaking.  TIAs are often precursors to strokes and could serve as warning to prevent future stroke occurence.  CT scan showed numerous ischemic changes which was confirmed with the  MRI of the brain. Neurology Dr. Jimenez and Cardiology Dr. Subramanian followed you throughout your hospital. An ECHO was done which showed normal heat function with a patent foramen ovale.   You were initally started on  ASPIRIN 81 MG DAILY and  ATORVASTATIN 80 MG DAILY. Based on your lab result and MRI Scan,  your medication was adjusted to ASPIRIN 81MG, ATORVASTATIN 40MG AND CLOPIDOGREL 75MG.  PLEASE CONTINUE TAKING ASPIRIN 81MG DAILY, CLOPIDOGREL 75MG DAILY  AND RESUME YOUR HOME MEDICATION SIMVASTATIN 20MG  . Please take medications as prescribed and follow up with your PCP/ Neurologist in a week from discharge.        SECONDARY DISCHARGE DIAGNOSES  Diagnosis: HLD (hyperlipidemia)  Assessment and Plan of Treatment: You have history of Hyperlipidemia. You take Simvastatin 20mg daily at home. Your home medication were held. You were initially started on Atorvastatin 80mg Daily. Lipid panel labs were within normal range, so  Atorvastin was decreased to 40mg.   PLEASE RESUME YOUR HOME MEDICATION SIMVASTATIN 20MG AS DIRECTED   . Maintain healthy lifestyle, low fat diet, exercise regularly and check your lipid levels routinely.   Please follow up with your PCP in 1 week from discharge.      Diagnosis: Migraine  Assessment and Plan of Treatment: You have a history of migraine. Please continue to use your home medication as needed for your migraine.

## 2023-12-11 NOTE — PROGRESS NOTE ADULT - SUBJECTIVE AND OBJECTIVE BOX
PGY-1 Progress Note discussed with attending    PAGER #: [[phone]] TILL 5:00 PM  PLEASE CONTACT ON CALL TEAM:  - On Call Team (Please refer to Dustin) FROM 5:00 PM - 8:30PM  - Nightfloat Team FROM 8:30 -7:30 AM    INTERVAL HPI/OVERNIGHT EVENTS:   No overnight events. Pt was examined in the morning at bedside with spouse and son present. Pt is AAOx3 and in NAD. Pt denies any symptoms     REVIEW OF SYSTEMS:  CONSTITUTIONAL: No fever, chills,  or fatigue  RESPIRATORY: No cough, wheezing, No shortness of breath  CARDIOVASCULAR: No chest pain, palpitations,  leg swelling  GASTROINTESTINAL: No abdominal pain. No nausea, vomiting, or hematemesis; No diarrhea or constipation. No bloody or black stool  GENITOURINARY: No dysuria or hematuria, urinary frequency  NEUROLOGICAL: No headaches, dizziness  SKIN: No itching, burning, rashes, or lesions     Vital Signs Last 24 Hrs  T(C): 36.4 (11 Dec 2023 04:41), Max: 36.9 (10 Dec 2023 18:12)  T(F): 97.5 (11 Dec 2023 04:41), Max: 98.4 (10 Dec 2023 18:12)  HR: 61 (11 Dec 2023 04:41) (59 - 71)  BP: 144/67 (11 Dec 2023 04:41) (111/69 - 161/94)  BP(mean): --  RR: 18 (11 Dec 2023 04:41) (16 - 18)  SpO2: 96% (11 Dec 2023 04:41) (96% - 100%)    Parameters below as of 11 Dec 2023 04:41  Patient On (Oxygen Delivery Method): room air      PHYSICAL EXAMINATION:  GENERAL: NAD,   HEAD:  Atraumatic, Normocephalic  EYES:  PERRLA,  conjunctiva and sclera clear  NECK: Supple, No JVD,   CHEST/LUNG: Clear to auscultation. Clear to percussion bilaterally; No rales, rhonchi, wheezing, or rubs  HEART: Regular rate and rhythm; No murmurs, rubs, or gallops  ABDOMEN: Soft, Nontender, Nondistended; Bowel sounds present, No Rovsing's sign   NERVOUS SYSTEM:  Alert & Oriented X3, CN Vll - Vll intact, 5/5 strength in B/L UE & LE, normal gait, sensation intact  EXTREMITIES:  2+ Peripheral Pulses, No clubbing, cyanosis, or edema  CALF: No Calf tenderness   SKIN: warm dry                        11.0   3.75  )-----------( 270      ( 11 Dec 2023 05:57 )             35.5     12-11    135  |  105  |  15  ----------------------------<  91  3.7   |  25  |  0.56    Ca    7.8<L>      11 Dec 2023 05:57  Phos  3.4     12-11  Mg     2.1     12-11    TPro  6.2  /  Alb  3.3<L>  /  TBili  0.2  /  DBili  x   /  AST  22  /  ALT  32  /  AlkPhos  67  12-11    LIVER FUNCTIONS - ( 11 Dec 2023 05:57 )  Alb: 3.3 g/dL / Pro: 6.2 g/dL / ALK PHOS: 67 U/L / ALT: 32 U/L DA / AST: 22 U/L / GGT: x               PT/INR - ( 09 Dec 2023 10:45 )   PT: 10.3 sec;   INR: 0.90 ratio         PTT - ( 09 Dec 2023 10:45 )  PTT:28.1 sec    CAPILLARY BLOOD GLUCOSE      RADIOLOGY & ADDITIONAL TESTS:                   PGY-1 Progress Note discussed with attending    PAGER #: [[phone]] TILL 5:00 PM  PLEASE CONTACT ON CALL TEAM:  - On Call Team (Please refer to Dustin) FROM 5:00 PM - 8:30PM  - Nightfloat Team FROM 8:30 -7:30 AM    INTERVAL HPI/OVERNIGHT EVENTS:   No overnight events. Pt was examined in the morning at bedside with spouse and son present. Pt is AAOx3 and in NAD. Pt denies any symptoms     REVIEW OF SYSTEMS:  CONSTITUTIONAL: No fever, chills,  or fatigue  RESPIRATORY: No cough, wheezing, No shortness of breath  CARDIOVASCULAR: No chest pain, palpitations,  leg swelling  GASTROINTESTINAL: No abdominal pain. No nausea, vomiting, or hematemesis; No diarrhea or constipation. No bloody or black stool  GENITOURINARY: No dysuria or hematuria, urinary frequency  NEUROLOGICAL: No headaches, dizziness  SKIN: No itching, burning, rashes, or lesions     Vital Signs Last 24 Hrs  T(C): 36.4 (11 Dec 2023 04:41), Max: 36.9 (10 Dec 2023 18:12)  T(F): 97.5 (11 Dec 2023 04:41), Max: 98.4 (10 Dec 2023 18:12)  HR: 61 (11 Dec 2023 04:41) (59 - 71)  BP: 144/67 (11 Dec 2023 04:41) (111/69 - 161/94)  BP(mean): --  RR: 18 (11 Dec 2023 04:41) (16 - 18)  SpO2: 96% (11 Dec 2023 04:41) (96% - 100%)    Parameters below as of 11 Dec 2023 04:41  Patient On (Oxygen Delivery Method): room air      PHYSICAL EXAMINATION:  GENERAL: NAD,   HEAD:  Atraumatic, Normocephalic  EYES:  PERRLA,  conjunctiva and sclera clear  NECK: Supple, No JVD,   CHEST/LUNG: Clear to auscultation. Clear to percussion bilaterally; No rales, rhonchi, wheezing, or rubs  HEART: Regular rate and rhythm; No murmurs, rubs, or gallops  ABDOMEN: Soft, Nontender, Nondistended; Bowel sounds present, No Rovsing's sign   NERVOUS SYSTEM:  Alert & Oriented X3, CN Vll - Vll intact, 5/5 strength in B/L UE & LE, normal gait, sensation intact  EXTREMITIES:  2+ Peripheral Pulses, No clubbing, cyanosis, or edema  CALF: No Calf tenderness   SKIN: warm dry                        11.0   3.75  )-----------( 270      ( 11 Dec 2023 05:57 )             35.5     12-11    135  |  105  |  15  ----------------------------<  91  3.7   |  25  |  0.56    Ca    7.8<L>      11 Dec 2023 05:57  Phos  3.4     12-11  Mg     2.1     12-11    TPro  6.2  /  Alb  3.3<L>  /  TBili  0.2  /  DBili  x   /  AST  22  /  ALT  32  /  AlkPhos  67  12-11    LIVER FUNCTIONS - ( 11 Dec 2023 05:57 )  Alb: 3.3 g/dL / Pro: 6.2 g/dL / ALK PHOS: 67 U/L / ALT: 32 U/L DA / AST: 22 U/L / GGT: x               PT/INR - ( 09 Dec 2023 10:45 )   PT: 10.3 sec;   INR: 0.90 ratio         PTT - ( 09 Dec 2023 10:45 )  PTT:28.1 sec    CAPILLARY BLOOD GLUCOSE      RADIOLOGY & ADDITIONAL TESTS:

## 2023-12-11 NOTE — DISCHARGE NOTE PROVIDER - NSDCMRMEDTOKEN_GEN_ALL_CORE_FT
aspirin 81 mg oral tablet: 1 tab(s) orally 5 times a week  butalbital/aspirin/caffeine 50 mg-325 mg-40 mg oral tablet: 2 tab(s) orally 2 times a day as needed for  headache  ondansetron 8 mg oral tablet: 1 tab(s) orally every 8 hours as needed for  nausea  simvastatin 20 mg oral tablet: 0.5 tab(s) orally once a day (at bedtime)   aspirin 81 mg oral tablet: 1 tab(s) orally 5 times a week  butalbital/aspirin/caffeine 50 mg-325 mg-40 mg oral tablet: 2 tab(s) orally 2 times a day as needed for  headache  clopidogrel 75 mg oral tablet: 1 tab(s) orally once a day  ondansetron 8 mg oral tablet: 1 tab(s) orally every 8 hours as needed for  nausea  simvastatin 20 mg oral tablet: 0.5 tab(s) orally once a day (at bedtime)

## 2023-12-11 NOTE — PROGRESS NOTE ADULT - ATTENDING COMMENTS
discussed management plan with house staff, pt and ts family   all questions/ concerns addressed
discussed management plan with house staff , pt and pts family

## 2023-12-11 NOTE — PROGRESS NOTE ADULT - SUBJECTIVE AND OBJECTIVE BOX
PATIENT SEEN AND EXAMINED BY KARTHIKEYAN CHRISTIANSON M.D. ON :- 12/11/23  DATE OF SERVICE:      12/11/23       Interim events noted,Labs ,Radiological studies and Cardiology tests reviewed .  DISCUSSED WITH ACP/MEDICAL RESIDENT ON PLAN OF CARE.    MR#[id number]  PATIENT NAME:SUDHA METZ    DATE OF SERVICE: 12-11-23   Patient was seen and examined by Karthikeyan Christianson MD on    12-11-23   Interim events noted.Consultant notes ,Labs,Telemetry reviewed by me       HOSPITAL COURSE: HPI:  This is a 72 y/o F, from home with PMHx of three previous TIAs (no residual deficits), HLD, and migraines who presents with difficulty speaking. Pt states this morning she had difficulty saying any words which prompted her  to bring her to the hospital. Speech improved once she arrived to the hospital. Pt denies any recent illnesses, CP, fever, SOB, N/V/D, headaches, numbness or tingling.      (09 Dec 2023 14:40)      INTERIM EVENTS:Patient seen at bedside ,interim events noted.      PMH -reviewed admission note, no change since admission  HEART FAILURE: Acute[ ]Chronic[ ] Systolic[ ] Diastolic[ ] Combined Systolic and Diastolic[ ]  CAD[ ] CABG[ ] PCI[ ]  DEVICES[ ] PPM[ ] ICD[ ] ILR[ ]  ATRIAL FIBRILLATION[ ] Paroxysmal[ ] Permanent[ ] CHADS2-[  ]  TOMER[ ] CKD1[ ] CKD2[ ] CKD3[ ] CKD4[ ] ESRD[ ]  COPD[ ] HTN[ ]   DM[ ] Type1[ ] Type 2[ ]   CVA[ ] Paresis[ ]    AMBULATION: Assisted[ ] Cane/walker[ ] Independent[ ]    MEDICATIONS  (STANDING):  aspirin  chewable 81 milliGRAM(s) Oral daily  atorvastatin 40 milliGRAM(s) Oral at bedtime    MEDICATIONS  (PRN):  acetaminophen     Tablet .. 650 milliGRAM(s) Oral every 6 hours PRN Temp greater or equal to 38C (100.4F), Mild Pain (1 - 3)  aluminum hydroxide/magnesium hydroxide/simethicone Suspension 30 milliLiter(s) Oral every 4 hours PRN Dyspepsia            REVIEW OF SYSTEMS:  Constitutional: [ ] fever, [ ]weight loss,  [ ]fatigue [ ]weight gain  Eyes: [ ] visual changes  Respiratory: [ ]shortness of breath;  [ ] cough, [ ]wheezing, [ ]chills, [ ]hemoptysis  Cardiovascular: [ ] chest pain, [ ]palpitations, [ ]dizziness,  [ ]leg swelling[ ]orthopnea[ ]PND  Gastrointestinal: [ ] abdominal pain, [ ]nausea, [ ]vomiting,  [ ]diarrhea [ ]Constipation [ ]Melena  Genitourinary: [ ] dysuria, [ ] hematuria [ ]Fan  Neurologic: [ ] headaches [ ] tremors[ ]weakness [ ]Paralysis Right[ ] Left[ ]  Skin: [ ] itching, [ ]burning, [ ] rashes  Endocrine: [ ] heat or cold intolerance  Musculoskeletal: [ ] joint pain or swelling; [ ] muscle, back, or extremity pain  Psychiatric: [ ] depression, [ ]anxiety, [ ]mood swings, or [ ]difficulty sleeping  Hematologic: [ ] easy bruising, [ ] bleeding gums    [ ] All remaining systems negative except as per above.   [ ]Unable to obtain.  [x] No change in ROS since admission      Vital Signs Last 24 Hrs  T(C): 36.6 (11 Dec 2023 21:40), Max: 36.6 (11 Dec 2023 10:08)  T(F): 97.9 (11 Dec 2023 21:40), Max: 97.9 (11 Dec 2023 10:08)  HR: 67 (11 Dec 2023 21:40) (59 - 69)  BP: 143/85 (11 Dec 2023 21:40) (136/77 - 156/100)  BP(mean): --  RR: 18 (11 Dec 2023 21:40) (18 - 18)  SpO2: 98% (11 Dec 2023 21:40) (96% - 100%)    Parameters below as of 11 Dec 2023 21:40  Patient On (Oxygen Delivery Method): room air      I&O's Summary      PHYSICAL EXAM:  General: No acute distress BMI-  HEENT: EOMI, PERRL  Neck: Supple, [ ] JVD  Lungs: Equal air entry bilaterally; [ ] rales [ ] wheezing [ ] rhonchi  Heart: Regular rate and rhythm; [x ] murmur   2/6 [ x] systolic [ ] diastolic [ ] radiation[ ] rubs [ ]  gallops  Abdomen: Nontender, bowel sounds present  Extremities: No clubbing, cyanosis, [ ] edema [ ]Pulses  equal and intact  Nervous system:  Alert & Oriented X3, no focal deficits  Psychiatric: Normal affect  Skin: No rashes or lesions    LABS:  12-11    135  |  105  |  15  ----------------------------<  91  3.7   |  25  |  0.56    Ca    7.8<L>      11 Dec 2023 05:57  Phos  3.4     12-11  Mg     2.1     12-11    TPro  6.2  /  Alb  3.3<L>  /  TBili  0.2  /  DBili  x   /  AST  22  /  ALT  32  /  AlkPhos  67  12-11    Creatinine Trend: 0.56<--, 0.58<--, 0.55<--                        11.0   3.75  )-----------( 270      ( 11 Dec 2023 05:57 )             35.5         < from: Transthoracic Echocardiogram (12.11.23 @ 06:56) >    Patient name: WOLFGANG METZ  YOB: 1950   Age: 73 (F)   MR#: [id number]  Study Date: 12/11/2023  Location: [address] [place], [place] [address]Sonographer: Cindy Renee [facility]  Study quality: Technically good  Referring Physician:  GABRIELA DAUGHERTY MD  Blood Pressure: 141/65 mmHg  Height: 155 cm  Weight: 44 kg  BSA: 1.4 m2  ------------------------------------------------------------------------    PROCEDURE: Transthoracic echocardiogram with 2-D, M-Mode  and complete spectral and color flow Doppler.  Given 10__cc agitated saline intravenously.  INDICATION: Other sequalae following unspecified  cerebrovascular disease (I69.998)  HISTORY:  ------------------------------------------------------------------------  DIMENSIONS:  Dimensions:     Normal Values:  LA:     4.1 cm    2.0 - 4.0 cm  Ao:     3.1 cm    2.0 - 3.8 cm  SEPTUM: 0.9 cm    0.6 - 1.2 cm  PWT:    0.8 cm    0.6 - 1.1 cm  LVIDd:  4.1 cm    3.0 - 5.6 cm  LVIDs:  2.9 cm    1.8 - 4.0 cm      Derived Variables:  LVMI: 76 g/m2  RWT: 0.39  Ejection Fraction Visual Estimate: >70 %    ------------------------------------------------------------------------  OBSERVATIONS:  Mitral Valve: Normal mitral valve. Mitral annular  calcification. Trace mitral regurgitation.  Aortic Root: Aortic Root: 3.1 cm.  Aortic Valve: Trileaflet aortic valve. No aortic stenosis.  Trace aortic regurgitation.  Left Atrium: Normal left atrium.  LA volume index = 23  cc/m2.  Left Ventricle: Hyperdynamic left ventricular systolic  function (EF >70%). No regional wall motion abnormalities.  Asymmetric thickening of the basal anteroseptum; max  thickness 1.3cm.  Normal diastolic function.  Right Heart: Right atrium appears borderline dilated.  Normal right ventricular size and systolic function (TAPSE  2.9 cm). Normal tricuspid valve. Mild tricuspid  regurgitation. Normal pulmonic valve. Trace pulmonic  insufficiency is noted.  Pericardium/PleuraNormal pericardium with no pericardial  effusion.  Hemodynamic: RA Pressure is 3 mm Hg. RV systolic pressure  is normal at  22 mm Hg. Agitated saline injection revealed  bubbles in the left heart, consistent with patent foramen  ovale or atrial septal defect.  ------------------------------------------------------------------------  CONCLUSIONS:  1. Normal mitral valve.Mitral annular calcification. Trace  mitral regurgitation.  2. Trileaflet aortic valve. No aortic stenosis. Trace  aortic regurgitation.  3. Asymmetric thickening of the basal anteroseptum; max  thickness 1.3cm.  4. Hyperdynamic left ventricular systolic function (EF  >70%).  5. Right atrium appears borderline dilated.  6. Normal right ventricular size and systolic function  (TAPSE 2.9 cm).  7. Normal tricuspid valve. Mild tricuspid regurgitation.  8. Agitated saline injection revealed bubbles inthe left  heart, consistent with patent foramen ovale or atrial  septal defect.    *** No previous Echo exam.  ------------------------------------------------------------------------  Confirmed on  12/11/2023 - 15:25:57 by Karol Morocho MD  ------------------------------------------------------------------------    < end of copied text >         PATIENT SEEN AND EXAMINED BY KARTHIKEYAN CHRISTIANSON M.D. ON :- 12/11/23  DATE OF SERVICE:      12/11/23       Interim events noted,Labs ,Radiological studies and Cardiology tests reviewed .  DISCUSSED WITH ACP/MEDICAL RESIDENT ON PLAN OF CARE.    MR#[id number]  PATIENT NAME:SUDHA METZ    DATE OF SERVICE: 12-11-23   Patient was seen and examined by Karthikeyan Christianson MD on    12-11-23   Interim events noted.Consultant notes ,Labs,Telemetry reviewed by me       HOSPITAL COURSE: HPI:  This is a 74 y/o F, from home with PMHx of three previous TIAs (no residual deficits), HLD, and migraines who presents with difficulty speaking. Pt states this morning she had difficulty saying any words which prompted her  to bring her to the hospital. Speech improved once she arrived to the hospital. Pt denies any recent illnesses, CP, fever, SOB, N/V/D, headaches, numbness or tingling.      (09 Dec 2023 14:40)      INTERIM EVENTS:Patient seen at bedside ,interim events noted.      PMH -reviewed admission note, no change since admission  HEART FAILURE: Acute[ ]Chronic[ ] Systolic[ ] Diastolic[ ] Combined Systolic and Diastolic[ ]  CAD[ ] CABG[ ] PCI[ ]  DEVICES[ ] PPM[ ] ICD[ ] ILR[ ]  ATRIAL FIBRILLATION[ ] Paroxysmal[ ] Permanent[ ] CHADS2-[  ]  TOMER[ ] CKD1[ ] CKD2[ ] CKD3[ ] CKD4[ ] ESRD[ ]  COPD[ ] HTN[ ]   DM[ ] Type1[ ] Type 2[ ]   CVA[ ] Paresis[ ]    AMBULATION: Assisted[ ] Cane/walker[ ] Independent[ ]    MEDICATIONS  (STANDING):  aspirin  chewable 81 milliGRAM(s) Oral daily  atorvastatin 40 milliGRAM(s) Oral at bedtime    MEDICATIONS  (PRN):  acetaminophen     Tablet .. 650 milliGRAM(s) Oral every 6 hours PRN Temp greater or equal to 38C (100.4F), Mild Pain (1 - 3)  aluminum hydroxide/magnesium hydroxide/simethicone Suspension 30 milliLiter(s) Oral every 4 hours PRN Dyspepsia            REVIEW OF SYSTEMS:  Constitutional: [ ] fever, [ ]weight loss,  [ ]fatigue [ ]weight gain  Eyes: [ ] visual changes  Respiratory: [ ]shortness of breath;  [ ] cough, [ ]wheezing, [ ]chills, [ ]hemoptysis  Cardiovascular: [ ] chest pain, [ ]palpitations, [ ]dizziness,  [ ]leg swelling[ ]orthopnea[ ]PND  Gastrointestinal: [ ] abdominal pain, [ ]nausea, [ ]vomiting,  [ ]diarrhea [ ]Constipation [ ]Melena  Genitourinary: [ ] dysuria, [ ] hematuria [ ]Fan  Neurologic: [ ] headaches [ ] tremors[ ]weakness [ ]Paralysis Right[ ] Left[ ]  Skin: [ ] itching, [ ]burning, [ ] rashes  Endocrine: [ ] heat or cold intolerance  Musculoskeletal: [ ] joint pain or swelling; [ ] muscle, back, or extremity pain  Psychiatric: [ ] depression, [ ]anxiety, [ ]mood swings, or [ ]difficulty sleeping  Hematologic: [ ] easy bruising, [ ] bleeding gums    [ ] All remaining systems negative except as per above.   [ ]Unable to obtain.  [x] No change in ROS since admission      Vital Signs Last 24 Hrs  T(C): 36.6 (11 Dec 2023 21:40), Max: 36.6 (11 Dec 2023 10:08)  T(F): 97.9 (11 Dec 2023 21:40), Max: 97.9 (11 Dec 2023 10:08)  HR: 67 (11 Dec 2023 21:40) (59 - 69)  BP: 143/85 (11 Dec 2023 21:40) (136/77 - 156/100)  BP(mean): --  RR: 18 (11 Dec 2023 21:40) (18 - 18)  SpO2: 98% (11 Dec 2023 21:40) (96% - 100%)    Parameters below as of 11 Dec 2023 21:40  Patient On (Oxygen Delivery Method): room air      I&O's Summary      PHYSICAL EXAM:  General: No acute distress BMI-  HEENT: EOMI, PERRL  Neck: Supple, [ ] JVD  Lungs: Equal air entry bilaterally; [ ] rales [ ] wheezing [ ] rhonchi  Heart: Regular rate and rhythm; [x ] murmur   2/6 [ x] systolic [ ] diastolic [ ] radiation[ ] rubs [ ]  gallops  Abdomen: Nontender, bowel sounds present  Extremities: No clubbing, cyanosis, [ ] edema [ ]Pulses  equal and intact  Nervous system:  Alert & Oriented X3, no focal deficits  Psychiatric: Normal affect  Skin: No rashes or lesions    LABS:  12-11    135  |  105  |  15  ----------------------------<  91  3.7   |  25  |  0.56    Ca    7.8<L>      11 Dec 2023 05:57  Phos  3.4     12-11  Mg     2.1     12-11    TPro  6.2  /  Alb  3.3<L>  /  TBili  0.2  /  DBili  x   /  AST  22  /  ALT  32  /  AlkPhos  67  12-11    Creatinine Trend: 0.56<--, 0.58<--, 0.55<--                        11.0   3.75  )-----------( 270      ( 11 Dec 2023 05:57 )             35.5         < from: Transthoracic Echocardiogram (12.11.23 @ 06:56) >    Patient name: WOLFGANG METZ  YOB: 1950   Age: 73 (F)   MR#: [id number]  Study Date: 12/11/2023  Location: [address] [place], [place] [address]Sonographer: Cindy Renee [facility]  Study quality: Technically good  Referring Physician:  GABRIELA DAUGHERTY MD  Blood Pressure: 141/65 mmHg  Height: 155 cm  Weight: 44 kg  BSA: 1.4 m2  ------------------------------------------------------------------------    PROCEDURE: Transthoracic echocardiogram with 2-D, M-Mode  and complete spectral and color flow Doppler.  Given 10__cc agitated saline intravenously.  INDICATION: Other sequalae following unspecified  cerebrovascular disease (I69.998)  HISTORY:  ------------------------------------------------------------------------  DIMENSIONS:  Dimensions:     Normal Values:  LA:     4.1 cm    2.0 - 4.0 cm  Ao:     3.1 cm    2.0 - 3.8 cm  SEPTUM: 0.9 cm    0.6 - 1.2 cm  PWT:    0.8 cm    0.6 - 1.1 cm  LVIDd:  4.1 cm    3.0 - 5.6 cm  LVIDs:  2.9 cm    1.8 - 4.0 cm      Derived Variables:  LVMI: 76 g/m2  RWT: 0.39  Ejection Fraction Visual Estimate: >70 %    ------------------------------------------------------------------------  OBSERVATIONS:  Mitral Valve: Normal mitral valve. Mitral annular  calcification. Trace mitral regurgitation.  Aortic Root: Aortic Root: 3.1 cm.  Aortic Valve: Trileaflet aortic valve. No aortic stenosis.  Trace aortic regurgitation.  Left Atrium: Normal left atrium.  LA volume index = 23  cc/m2.  Left Ventricle: Hyperdynamic left ventricular systolic  function (EF >70%). No regional wall motion abnormalities.  Asymmetric thickening of the basal anteroseptum; max  thickness 1.3cm.  Normal diastolic function.  Right Heart: Right atrium appears borderline dilated.  Normal right ventricular size and systolic function (TAPSE  2.9 cm). Normal tricuspid valve. Mild tricuspid  regurgitation. Normal pulmonic valve. Trace pulmonic  insufficiency is noted.  Pericardium/PleuraNormal pericardium with no pericardial  effusion.  Hemodynamic: RA Pressure is 3 mm Hg. RV systolic pressure  is normal at  22 mm Hg. Agitated saline injection revealed  bubbles in the left heart, consistent with patent foramen  ovale or atrial septal defect.  ------------------------------------------------------------------------  CONCLUSIONS:  1. Normal mitral valve.Mitral annular calcification. Trace  mitral regurgitation.  2. Trileaflet aortic valve. No aortic stenosis. Trace  aortic regurgitation.  3. Asymmetric thickening of the basal anteroseptum; max  thickness 1.3cm.  4. Hyperdynamic left ventricular systolic function (EF  >70%).  5. Right atrium appears borderline dilated.  6. Normal right ventricular size and systolic function  (TAPSE 2.9 cm).  7. Normal tricuspid valve. Mild tricuspid regurgitation.  8. Agitated saline injection revealed bubbles inthe left  heart, consistent with patent foramen ovale or atrial  septal defect.    *** No previous Echo exam.  ------------------------------------------------------------------------  Confirmed on  12/11/2023 - 15:25:57 by Karol Morocho MD  ------------------------------------------------------------------------    < end of copied text >

## 2023-12-11 NOTE — PROGRESS NOTE ADULT - ASSESSMENT
72 y/o F, from home with PMHx of three previous TIAs (no residual deficits), HLD, and migraines who presents with difficulty speaking. Symptoms resolved in ED. Admitted for TIA workup.    #  TIA (transient ischemic attack).   ·  Plan: Pt presents with difficulty forming words, resolved upon arrival to ED  CT Head: Evidence of moderate chronic microvascular ischemic change with newly visualized focus of moderate hypoattenuation-cytotoxic edema right parieto-occipital region; a finding suspicious for acute-subacute ischemia in the appropriate clinical setting.  NIHSS 1 on admission (aphasia)  - C/w Neuro checks q4 hours  - C/w TELE monitoring  - C/w ASA, Statin, Plavix  - Echo Left Ventricle: Hyperdynamic left ventricular systolic  function (EF >70%).   - neuro f/u

## 2023-12-11 NOTE — DISCHARGE NOTE PROVIDER - CARE PROVIDERS DIRECT ADDRESSES
[email or web address]

## 2023-12-11 NOTE — DISCHARGE NOTE PROVIDER - PROVIDER TOKENS
PROVIDER:[TOKEN:[[id number]],FOLLOWUP:[1 week]] PROVIDER:[TOKEN:[[id number]],FOLLOWUP:[1 week]]

## 2023-12-11 NOTE — DISCHARGE NOTE PROVIDER - CARE PROVIDER_API CALL
Yelena Jimenez)  Neurology  [address] [facility], [address]nd [place] [address]  Phone: [phone]  Fax: [phone]  Follow Up Time: 1 week   Yelena Jimenez)  Neurology  [address] [facility], [address]nd [place] [address]  [place] [address]  Phone: [phone]  Fax: [phone]  Follow Up Time: 1 week

## 2023-12-11 NOTE — PROGRESS NOTE ADULT - PROBLEM SELECTOR PLAN 1
Pt presents with difficulty forming words, resolved upon arrival to ED  CT Head: Evidence of moderate chronic microvascular ischemic change with newly visualized focus of moderate hypoattenuation-cytotoxic edema right parieto-occipital region; a finding suspicious for acute-subacute ischemia in the appropriate clinical setting.  NIHSS 1 on admission (aphasia)  A1c 5.6  Lipid wnl  - C/w Neuro checks q4 hours  - C/w TELE monitoring  - C/w ASA, Statin, Plavix  - F/u Echo  - F/u MRI  Neuro Dr. Jimenez Consulted Pt presents with difficulty forming words, resolved upon arrival to ED  CT Head:  moderate chronic microvascular ischemic change with newly visualized focus of moderate hypoattenuation-cytotoxic edema right parieto-occipital region; a finding suspicious for acute-subacute ischemia in the appropriate clinical setting.  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 No acute hemorrhage. amyloid angiopathy. White matter ischemia is noted with old cortical infarction in the RIGHT occipital lobe. Tiny old lacunar infarctions are seen in the BILATERAL basal ganglia, corona radiata and cerebellum.    Neuro Dr. Jimenez Consulted  - C/w Neuro checks q4 hours  - C/w TELE monitoring  - C/w ASA, Statin, Pt presents with difficulty forming words, resolved upon arrival to ED  CT Head:  moderate chronic microvascular ischemic change with newly visualized focus of moderate hypoattenuation-cytotoxic edema right parieto-occipital region; a finding suspicious for acute-subacute ischemia in the appropriate clinical setting.  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 No acute hemorrhage. amyloid angiopathy. White matter ischemia is noted with old cortical infarction in the RIGHT occipital lobe. Tiny old lacunar infarctions are seen in the BILATERAL basal ganglia, corona radiata and cerebellum.    Neuro Dr. Jimenez Consulted  - C/w Neuro checks q4 hours  - C/w TELE monitoring  - C/w ASA, Statin,

## 2023-12-12 ENCOUNTER — TRANSCRIPTION ENCOUNTER (OUTPATIENT)
Age: 73
End: 2023-12-12

## 2023-12-12 VITALS
TEMPERATURE: 98 F | OXYGEN SATURATION: 100 % | RESPIRATION RATE: 17 BRPM | DIASTOLIC BLOOD PRESSURE: 72 MMHG | SYSTOLIC BLOOD PRESSURE: 136 MMHG | HEART RATE: 65 BPM

## 2023-12-12 LAB
ANION GAP SERPL CALC-SCNC: 5 MMOL/L — SIGNIFICANT CHANGE UP (ref 5–17)
ANION GAP SERPL CALC-SCNC: 5 MMOL/L — SIGNIFICANT CHANGE UP (ref 5–17)
BUN SERPL-MCNC: 16 MG/DL — SIGNIFICANT CHANGE UP (ref 7–18)
BUN SERPL-MCNC: 16 MG/DL — SIGNIFICANT CHANGE UP (ref 7–18)
CALCIUM SERPL-MCNC: 9 MG/DL — SIGNIFICANT CHANGE UP (ref 8.4–10.5)
CALCIUM SERPL-MCNC: 9 MG/DL — SIGNIFICANT CHANGE UP (ref 8.4–10.5)
CHLORIDE SERPL-SCNC: 106 MMOL/L — SIGNIFICANT CHANGE UP (ref 96–108)
CHLORIDE SERPL-SCNC: 106 MMOL/L — SIGNIFICANT CHANGE UP (ref 96–108)
CO2 SERPL-SCNC: 25 MMOL/L — SIGNIFICANT CHANGE UP (ref 22–31)
CO2 SERPL-SCNC: 25 MMOL/L — SIGNIFICANT CHANGE UP (ref 22–31)
CREAT SERPL-MCNC: 0.46 MG/DL — LOW (ref 0.5–1.3)
CREAT SERPL-MCNC: 0.46 MG/DL — LOW (ref 0.5–1.3)
EGFR: 100 ML/MIN/1.73M2 — SIGNIFICANT CHANGE UP
EGFR: 100 ML/MIN/1.73M2 — SIGNIFICANT CHANGE UP
GLUCOSE SERPL-MCNC: 89 MG/DL — SIGNIFICANT CHANGE UP (ref 70–99)
GLUCOSE SERPL-MCNC: 89 MG/DL — SIGNIFICANT CHANGE UP (ref 70–99)
HCT VFR BLD CALC: 34.2 % — LOW (ref 34.5–45)
HCT VFR BLD CALC: 34.2 % — LOW (ref 34.5–45)
HGB BLD-MCNC: 11 G/DL — LOW (ref 11.5–15.5)
HGB BLD-MCNC: 11 G/DL — LOW (ref 11.5–15.5)
MCHC RBC-ENTMCNC: 29.8 PG — SIGNIFICANT CHANGE UP (ref 27–34)
MCHC RBC-ENTMCNC: 29.8 PG — SIGNIFICANT CHANGE UP (ref 27–34)
MCHC RBC-ENTMCNC: 32.2 GM/DL — SIGNIFICANT CHANGE UP (ref 32–36)
MCHC RBC-ENTMCNC: 32.2 GM/DL — SIGNIFICANT CHANGE UP (ref 32–36)
MCV RBC AUTO: 92.7 FL — SIGNIFICANT CHANGE UP (ref 80–100)
MCV RBC AUTO: 92.7 FL — SIGNIFICANT CHANGE UP (ref 80–100)
NRBC # BLD: 0 /100 WBCS — SIGNIFICANT CHANGE UP (ref 0–0)
NRBC # BLD: 0 /100 WBCS — SIGNIFICANT CHANGE UP (ref 0–0)
PLATELET # BLD AUTO: 267 K/UL — SIGNIFICANT CHANGE UP (ref 150–400)
PLATELET # BLD AUTO: 267 K/UL — SIGNIFICANT CHANGE UP (ref 150–400)
POTASSIUM SERPL-MCNC: 3.9 MMOL/L — SIGNIFICANT CHANGE UP (ref 3.5–5.3)
POTASSIUM SERPL-MCNC: 3.9 MMOL/L — SIGNIFICANT CHANGE UP (ref 3.5–5.3)
POTASSIUM SERPL-SCNC: 3.9 MMOL/L — SIGNIFICANT CHANGE UP (ref 3.5–5.3)
POTASSIUM SERPL-SCNC: 3.9 MMOL/L — SIGNIFICANT CHANGE UP (ref 3.5–5.3)
RBC # BLD: 3.69 M/UL — LOW (ref 3.8–5.2)
RBC # BLD: 3.69 M/UL — LOW (ref 3.8–5.2)
RBC # FLD: 13.9 % — SIGNIFICANT CHANGE UP (ref 10.3–14.5)
RBC # FLD: 13.9 % — SIGNIFICANT CHANGE UP (ref 10.3–14.5)
SODIUM SERPL-SCNC: 136 MMOL/L — SIGNIFICANT CHANGE UP (ref 135–145)
SODIUM SERPL-SCNC: 136 MMOL/L — SIGNIFICANT CHANGE UP (ref 135–145)
WBC # BLD: 3.24 K/UL — LOW (ref 3.8–10.5)
WBC # BLD: 3.24 K/UL — LOW (ref 3.8–10.5)
WBC # FLD AUTO: 3.24 K/UL — LOW (ref 3.8–10.5)
WBC # FLD AUTO: 3.24 K/UL — LOW (ref 3.8–10.5)

## 2023-12-12 PROCEDURE — 85730 THROMBOPLASTIN TIME PARTIAL: CPT

## 2023-12-12 PROCEDURE — 86803 HEPATITIS C AB TEST: CPT

## 2023-12-12 PROCEDURE — 83036 HEMOGLOBIN GLYCOSYLATED A1C: CPT

## 2023-12-12 PROCEDURE — 85027 COMPLETE CBC AUTOMATED: CPT

## 2023-12-12 PROCEDURE — 70551 MRI BRAIN STEM W/O DYE: CPT

## 2023-12-12 PROCEDURE — 84443 ASSAY THYROID STIM HORMONE: CPT

## 2023-12-12 PROCEDURE — 70498 CT ANGIOGRAPHY NECK: CPT | Mod: MA

## 2023-12-12 PROCEDURE — 36415 COLL VENOUS BLD VENIPUNCTURE: CPT

## 2023-12-12 PROCEDURE — 85025 COMPLETE CBC W/AUTO DIFF WBC: CPT

## 2023-12-12 PROCEDURE — 83735 ASSAY OF MAGNESIUM: CPT

## 2023-12-12 PROCEDURE — 97161 PT EVAL LOW COMPLEX 20 MIN: CPT

## 2023-12-12 PROCEDURE — 80061 LIPID PANEL: CPT

## 2023-12-12 PROCEDURE — 80053 COMPREHEN METABOLIC PANEL: CPT

## 2023-12-12 PROCEDURE — 80048 BASIC METABOLIC PNL TOTAL CA: CPT

## 2023-12-12 PROCEDURE — 93005 ELECTROCARDIOGRAM TRACING: CPT

## 2023-12-12 PROCEDURE — 80307 DRUG TEST PRSMV CHEM ANLYZR: CPT

## 2023-12-12 PROCEDURE — 93306 TTE W/DOPPLER COMPLETE: CPT

## 2023-12-12 PROCEDURE — 0042T: CPT | Mod: MA

## 2023-12-12 PROCEDURE — 70496 CT ANGIOGRAPHY HEAD: CPT | Mod: MA

## 2023-12-12 PROCEDURE — 84484 ASSAY OF TROPONIN QUANT: CPT

## 2023-12-12 PROCEDURE — 93291 INTERROG DEV EVAL SCRMS IP: CPT | Mod: 26

## 2023-12-12 PROCEDURE — 99285 EMERGENCY DEPT VISIT HI MDM: CPT | Mod: 25

## 2023-12-12 PROCEDURE — 82962 GLUCOSE BLOOD TEST: CPT

## 2023-12-12 PROCEDURE — 99232 SBSQ HOSP IP/OBS MODERATE 35: CPT | Mod: FS

## 2023-12-12 PROCEDURE — 85610 PROTHROMBIN TIME: CPT

## 2023-12-12 PROCEDURE — 84100 ASSAY OF PHOSPHORUS: CPT

## 2023-12-12 PROCEDURE — 70450 CT HEAD/BRAIN W/O DYE: CPT | Mod: MA

## 2023-12-12 RX ORDER — CLOPIDOGREL BISULFATE 75 MG/1
1 TABLET, FILM COATED ORAL
Qty: 30 | Refills: 0
Start: 2023-12-12 | End: 2024-01-10

## 2023-12-12 RX ORDER — CLOPIDOGREL BISULFATE 75 MG/1
75 TABLET, FILM COATED ORAL DAILY
Refills: 0 | Status: DISCONTINUED | OUTPATIENT
Start: 2023-12-12 | End: 2023-12-12

## 2023-12-12 RX ADMIN — Medication 650 MILLIGRAM(S): at 06:15

## 2023-12-12 RX ADMIN — Medication 650 MILLIGRAM(S): at 11:36

## 2023-12-12 RX ADMIN — Medication 650 MILLIGRAM(S): at 06:26

## 2023-12-12 RX ADMIN — Medication 650 MILLIGRAM(S): at 12:36

## 2023-12-12 RX ADMIN — CLOPIDOGREL BISULFATE 75 MILLIGRAM(S): 75 TABLET, FILM COATED ORAL at 11:37

## 2023-12-12 RX ADMIN — Medication 81 MILLIGRAM(S): at 11:37

## 2023-12-12 NOTE — PROGRESS NOTE ADULT - ASSESSMENT
72 y/o F, from home with PMHx of three previous TIAs (no residual deficits), HLD, and migraines who presents with difficulty speaking. Symptoms resolved in ED. Admitted for TIA workup. 74 y/o F, from home with PMHx of three previous TIAs (no residual deficits), HLD, and migraines who presents with difficulty speaking. Symptoms resolved in ED. Admitted for TIA workup.

## 2023-12-12 NOTE — PROGRESS NOTE ADULT - PROBLEM SELECTOR PLAN 1
Pt presents with difficulty forming words, resolved upon arrival to ED  CT Head:  moderate chronic microvascular ischemic change with newly visualized focus of moderate hypoattenuation-cytotoxic edema right parieto-occipital region; a finding suspicious for acute-subacute ischemia in the appropriate clinical setting.  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 No acute hemorrhage. amyloid angiopathy. White matter ischemia is noted with old cortical infarction in the RIGHT occipital lobe. Tiny old lacunar infarctions are seen in the BILATERAL basal ganglia, corona radiata and cerebellum.  Neuro Dr. Jimenez Consulted  - C/w Neuro checks q4 hours  - C/w TELE monitoring  - C/w ASA, Statin, Pt presents with difficulty forming words, resolved upon arrival to ED  CT Head:  moderate chronic microvascular ischemic change with newly visualized focus of moderate hypoattenuation-cytotoxic edema right parieto-occipital region; a finding suspicious for acute-subacute ischemia in the appropriate clinical setting.  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 No acute hemorrhage. amyloid angiopathy. White matter ischemia is noted with old cortical infarction in the RIGHT occipital lobe. Tiny old lacunar infarctions are seen in the BILATERAL basal ganglia, corona radiata and cerebellum.  Neuro Dr. Jimenez Consulted  - C/w Neuro checks q4 hours  - C/w TELE monitoring  - C/w ASA, Statin, clopidogrel

## 2023-12-12 NOTE — DISCHARGE NOTE NURSING/CASE MANAGEMENT/SOCIAL WORK - NSDCPEFALRISK_GEN_ALL_CORE
For information on Fall & Injury Prevention, visit: [email or web address] OR  [email or web address] OR  https://www.cdc.gov/steadi/patient.html For information on Fall & Injury Prevention, visit: [email or web address] OR  [email or web address] OR  https://www.cdc.gov/steadi/patient.html

## 2023-12-12 NOTE — CHART NOTE - NSCHARTNOTEFT_GEN_A_CORE
I have reviewed the MRI images from 12/11/2023. Te images ar econsistent with the consultation note and show multiple new left hemispheric infarcts and hemosiderin deposition at old sites of stroke consistent with cerebral amyloid angiopathy. Plan continue aspirin and clopidogrel and atorvastatin. May be discharged to home with home care and follow up with neurologist home PT speech if medically stable and safe.    < from: MR Head No Cont (12.11.23 @ 12:28) >      ACC: [id number] EXAM:  MR BRAIN   ORDERED BY: SHIRLEY CHEEMA     PROCEDURE DATE:  12/11/2023          INTERPRETATION:  MR brain  without gadolinium    CLINICAL INFORMATION: TIA    TECHNIQUE:   Sagittal and axial T1-weighted images, axial FLAIR images,   axial gradient echo and T2-weighted images and axial diffusion weighted   images of the brain were obtained.    FINDINGS:   CT scan dated 12/09/2023 available for review.    The brain demonstrates restricted diffusion in the LEFT basal ganglia and   scattered in the LEFT frontal and parietal cortex, consistent with   multiple acute infarctions in the region of the LEFT MCA territory. No   acute hemorrhage is seen. However there are innumerable foci of   hemosiderin scattered in the cortex ofthe leptomeningeal region   throughout the brain suggestive of amyloid angiopathy. Mild pontine and   moderate periventricular, deep and subcortical white matter ischemia is   noted with old cortical infarction in the RIGHT occipital lobe. Tiny old   lacunar infarctions are seen in the BILATERAL basal ganglia, corona   radiata and cerebellum. No mass effect is found in the brain.    The ventricles, sulci and basal cisterns appear unremarkable.    The vertebral and internal carotid arteries demonstrate expected flow   voids indicating their patency.    The orbits are unremarkable. The lenses are surgically small. The   paranasal sinuses are clear.  The nasal cavity appears intact.  The   nasopharynx is symmetric.  The central skull base and temporal bones are   intact.  The calvarium appears unremarkable.    IMPRESSION:   Restricted diffusion in the LEFT basal ganglia and   scattered in the LEFT frontal and parietal cortex, consistent with   multiple acute infarctions in the region of the LEFT MCA territory. No   acute hemorrhage is seen. However there are innumerable foci of   hemosiderin scattered in the cortex of the leptomeningeal region   throughout the brain suggestive of amyloid angiopathy. Mild pontine and   moderate periventricular, deep and subcortical white matter ischemia is   noted with old cortical infarction in the RIGHT occipital lobe. Tiny old   lacunar infarctions are seen in the BILATERAL basal ganglia, corona   radiata and cerebellum.    --- End of Report ---            TRISH TORO MD; Attending Radiologist  This document has been electronically signed. Dec 11 2023 12:52PM    < end of copied text > I have reviewed the MRI images from 12/11/2023. Te images ar econsistent with the consultation note and show multiple new left hemispheric infarcts and hemosiderin deposition at old sites of stroke consistent with cerebral amyloid angiopathy. Plan continue aspirin and clopidogrel and atorvastatin. May be discharged to home with home care and follow up with neurologist home PT speech if medically stable and safe.    < from: MR Head No Cont (12.11.23 @ 12:28) >      ACC: [id number] EXAM:  MR BRAIN   ORDERED BY: SHIRLEY CHEEMA     PROCEDURE DATE:  12/11/2023          INTERPRETATION:  MR brain  without gadolinium    CLINICAL INFORMATION: TIA    TECHNIQUE:   Sagittal and axial T1-weighted images, axial FLAIR images,   axial gradient echo and T2-weighted images and axial diffusion weighted   images of the brain were obtained.    FINDINGS:   CT scan dated 12/09/2023 available for review.    The brain demonstrates restricted diffusion in the LEFT basal ganglia and   scattered in the LEFT frontal and parietal cortex, consistent with   multiple acute infarctions in the region of the LEFT MCA territory. No   acute hemorrhage is seen. However there are innumerable foci of   hemosiderin scattered in the cortex ofthe leptomeningeal region   throughout the brain suggestive of amyloid angiopathy. Mild pontine and   moderate periventricular, deep and subcortical white matter ischemia is   noted with old cortical infarction in the RIGHT occipital lobe. Tiny old   lacunar infarctions are seen in the BILATERAL basal ganglia, corona   radiata and cerebellum. No mass effect is found in the brain.    The ventricles, sulci and basal cisterns appear unremarkable.    The vertebral and internal carotid arteries demonstrate expected flow   voids indicating their patency.    The orbits are unremarkable. The lenses are surgically small. The   paranasal sinuses are clear.  The nasal cavity appears intact.  The   nasopharynx is symmetric.  The central skull base and temporal bones are   intact.  The calvarium appears unremarkable.    IMPRESSION:   Restricted diffusion in the LEFT basal ganglia and   scattered in the LEFT frontal and parietal cortex, consistent with   multiple acute infarctions in the region of the LEFT MCA territory. No   acute hemorrhage is seen. However there are innumerable foci of   hemosiderin scattered in the cortex of the leptomeningeal region   throughout the brain suggestive of amyloid angiopathy. Mild pontine and   moderate periventricular, deep and subcortical white matter ischemia is   noted with old cortical infarction in the RIGHT occipital lobe. Tiny old   lacunar infarctions are seen in the BILATERAL basal ganglia, corona   radiata and cerebellum.    --- End of Report ---            TRISH TORO MD; Attending Radiologist  This document has been electronically signed. Dec 11 2023 12:52PM    < end of copied text >

## 2023-12-12 NOTE — DISCHARGE NOTE NURSING/CASE MANAGEMENT/SOCIAL WORK - PATIENT PORTAL LINK FT
You can access the FollowMyHealth Patient Portal offered by [facility] by registering at the following website: [email or web address]. By joining [facility]’s FollowMyHealth portal, you will also be able to view your health information using other applications (apps) compatible with our system. You can access the FollowMyHealth Patient Portal offered by [facility] by registering at the following website: [email or web address]. By joining [employer]’s FollowMyHealth portal, you will also be able to view your health information using other applications (apps) compatible with our system.

## 2023-12-12 NOTE — PROGRESS NOTE ADULT - SUBJECTIVE AND OBJECTIVE BOX
PGY-1 Progress Note discussed with attending    PAGER #: [[phone]] TILL 5:00 PM  PLEASE CONTACT ON CALL TEAM:  - On Call Team (Please refer to Dustin) FROM 5:00 PM - 8:30PM  - Nightfloat Team FROM 8:30 -7:30 AM    INTERVAL HPI/OVERNIGHT EVENTS:   No overnight events. Pt was examined in the morning at bedside with spouse and son present. Pt is AAOx3 and in NAD. Pt denies any symptoms       REVIEW OF SYSTEMS:  CONSTITUTIONAL: No fever, chills,  or fatigue  RESPIRATORY: No cough, wheezing, No shortness of breath  CARDIOVASCULAR: No chest pain, palpitations,  leg swelling  GASTROINTESTINAL: No abdominal pain. No nausea, vomiting, or hematemesis; No diarrhea or constipation. No bloody or black stool  GENITOURINARY: No dysuria or hematuria, urinary frequency  NEUROLOGICAL: No headaches, dizziness  SKIN: No itching, burning, rashes, or lesions     Vital Signs Last 24 Hrs  T(C): 36.6 (12 Dec 2023 05:11), Max: 36.6 (11 Dec 2023 10:08)  T(F): 97.9 (12 Dec 2023 05:11), Max: 97.9 (11 Dec 2023 10:08)  HR: 67 (12 Dec 2023 05:11) (60 - 69)  BP: 132/66 (12 Dec 2023 05:11) (132/66 - 156/100)  BP(mean): --  RR: 18 (12 Dec 2023 05:11) (18 - 18)  SpO2: 100% (12 Dec 2023 05:11) (98% - 100%)    Parameters below as of 12 Dec 2023 05:11  Patient On (Oxygen Delivery Method): room air        PHYSICAL EXAMINATION:  GENERAL: NAD,   HEAD:  Atraumatic, Normocephalic  EYES:  PERRLA,  conjunctiva and sclera clear  NECK: Supple, No JVD,   CHEST/LUNG: Clear to auscultation. Clear to percussion bilaterally; No rales, rhonchi, wheezing, or rubs  HEART: Regular rate and rhythm; No murmurs, rubs, or gallops  ABDOMEN: Soft, Nontender, Nondistended; Bowel sounds present, No Rovsing's sign   NERVOUS SYSTEM:  Alert & Oriented X3, CN Vll - Vll intact, 5/5 strength in B/L UE & LE, normal gait, sensation intact  EXTREMITIES:  2+ Peripheral Pulses, No clubbing, cyanosis, or edema  CALF: No Calf tenderness   SKIN: warm dry                                   11.0   3.24  )-----------( 267      ( 12 Dec 2023 05:50 )             34.2     12-12    136  |  106  |  16  ----------------------------<  89  3.9   |  25  |  0.46<L>    Ca    9.0      12 Dec 2023 05:50  Phos  3.4     12-11  Mg     2.1     12-11    TPro  6.2  /  Alb  3.3<L>  /  TBili  0.2  /  DBili  x   /  AST  22  /  ALT  32  /  AlkPhos  67  12-11    LIVER FUNCTIONS - ( 11 Dec 2023 05:57 )  Alb: 3.3 g/dL / Pro: 6.2 g/dL / ALK PHOS: 67 U/L / ALT: 32 U/L DA / AST: 22 U/L / GGT: x                   CAPILLARY BLOOD GLUCOSE      RADIOLOGY & ADDITIONAL TESTS:                   PGY-1 Progress Note discussed with attending    PAGER #: [[phone]] TILL 5:00 PM  PLEASE CONTACT ON CALL TEAM:  - On Call Team (Please refer to Dustin) FROM 5:00 PM - 8:30PM  - Nightfloat Team FROM 8:30 -7:30 AM    INTERVAL HPI/OVERNIGHT EVENTS:   No overnight events. Pt was examined in the morning at bedside with spouse and son present. Pt is AAOx3 and in NAD. Pt denies any symptoms       REVIEW OF SYSTEMS:  CONSTITUTIONAL: No fever, chills,  or fatigue  RESPIRATORY: No cough, wheezing, No shortness of breath  CARDIOVASCULAR: No chest pain, palpitations,  leg swelling  GASTROINTESTINAL: No abdominal pain. No nausea, vomiting, or hematemesis; No diarrhea or constipation. No bloody or black stool  GENITOURINARY: No dysuria or hematuria, urinary frequency  NEUROLOGICAL: No headaches, dizziness  SKIN: No itching, burning, rashes, or lesions     Vital Signs Last 24 Hrs  T(C): 36.6 (12 Dec 2023 05:11), Max: 36.6 (11 Dec 2023 10:08)  T(F): 97.9 (12 Dec 2023 05:11), Max: 97.9 (11 Dec 2023 10:08)  HR: 67 (12 Dec 2023 05:11) (60 - 69)  BP: 132/66 (12 Dec 2023 05:11) (132/66 - 156/100)  BP(mean): --  RR: 18 (12 Dec 2023 05:11) (18 - 18)  SpO2: 100% (12 Dec 2023 05:11) (98% - 100%)    Parameters below as of 12 Dec 2023 05:11  Patient On (Oxygen Delivery Method): room air        PHYSICAL EXAMINATION:  GENERAL: NAD,   HEAD:  Atraumatic, Normocephalic  EYES:  PERRLA,  conjunctiva and sclera clear  NECK: Supple, No JVD,   CHEST/LUNG: Clear to auscultation. Clear to percussion bilaterally; No rales, rhonchi, wheezing, or rubs  HEART: Regular rate and rhythm; No murmurs, rubs, or gallops  ABDOMEN: Soft, Nontender, Nondistended; Bowel sounds present, No Rovsing's sign   NERVOUS SYSTEM:  Alert & Oriented X3, CN Vll - Vll intact, 5/5 strength in B/L UE & LE, normal gait, sensation intact  EXTREMITIES:  2+ Peripheral Pulses, No clubbing, cyanosis, or edema  CALF: No Calf tenderness   SKIN: warm dry                                   11.0   3.24  )-----------( 267      ( 12 Dec 2023 05:50 )             34.2     12-12    136  |  106  |  16  ----------------------------<  89  3.9   |  25  |  0.46<L>    Ca    9.0      12 Dec 2023 05:50  Phos  3.4     12-11  Mg     2.1     12-11    TPro  6.2  /  Alb  3.3<L>  /  TBili  0.2  /  DBili  x   /  AST  22  /  ALT  32  /  AlkPhos  67  12-11    LIVER FUNCTIONS - ( 11 Dec 2023 05:57 )  Alb: 3.3 g/dL / Pro: 6.2 g/dL / ALK PHOS: 67 U/L / ALT: 32 U/L DA / AST: 22 U/L / GGT: x                   CAPILLARY BLOOD GLUCOSE      RADIOLOGY & ADDITIONAL TESTS:

## 2023-12-12 NOTE — PROGRESS NOTE ADULT - PROBLEM SELECTOR PLAN 2
Pt has a history of HLD, takes Simvastatin 10mg daily at home  Increased to high intensity statin  Lipid panel wnl  Decreasing Atorvastatin from 80 to 40mg  - C/w Atorvastatin 40mg daily

## 2023-12-12 NOTE — PROGRESS NOTE ADULT - PROBLEM SELECTOR PLAN 3
Pt has a history of migraines, takes butalbital at home as needed  - C/w Tylenol for headaches

## 2025-01-28 ENCOUNTER — APPOINTMENT (OUTPATIENT)
Dept: OPHTHALMOLOGY | Facility: CLINIC | Age: 75
End: 2025-01-28
Payer: MEDICARE

## 2025-01-28 ENCOUNTER — NON-APPOINTMENT (OUTPATIENT)
Age: 75
End: 2025-01-28

## 2025-01-28 PROCEDURE — 92004 COMPRE OPH EXAM NEW PT 1/>: CPT

## 2025-01-28 PROCEDURE — 92250 FUNDUS PHOTOGRAPHY W/I&R: CPT

## 2025-01-28 PROCEDURE — 92083 EXTENDED VISUAL FIELD XM: CPT
